# Patient Record
Sex: MALE | Race: WHITE | NOT HISPANIC OR LATINO | Employment: OTHER | URBAN - METROPOLITAN AREA
[De-identification: names, ages, dates, MRNs, and addresses within clinical notes are randomized per-mention and may not be internally consistent; named-entity substitution may affect disease eponyms.]

---

## 2017-01-25 ENCOUNTER — TELEPHONE (OUTPATIENT)
Dept: FAMILY MEDICINE | Facility: CLINIC | Age: 57
End: 2017-01-25

## 2017-01-25 RX ORDER — GABAPENTIN 600 MG/1
600 TABLET ORAL 2 TIMES DAILY
Qty: 90 TABLET | Refills: 2 | Status: SHIPPED | OUTPATIENT
Start: 2017-01-25 | End: 2017-04-06 | Stop reason: SDUPTHER

## 2017-01-25 NOTE — TELEPHONE ENCOUNTER
----- Message from Melany Moya sent at 1/25/2017 10:07 AM CST -----  Contact: self  Patient is calling for drug screening results.   Patient also needs a refill on Gabapentin.  Please call patient at [phone]. Thanks!     [facility] [address] [place] [address] AT [address] [address]  Phone: [phone] Fax: [phone]

## 2017-01-25 NOTE — TELEPHONE ENCOUNTER
Please review and advise. Pt's Gabapentin was done on 12/15/16 but not sent to pharmacy. Please refill. Will call pt to advise.

## 2017-03-13 ENCOUNTER — OFFICE VISIT (OUTPATIENT)
Dept: FAMILY MEDICINE | Facility: CLINIC | Age: 57
End: 2017-03-13
Payer: MEDICARE

## 2017-03-13 VITALS
DIASTOLIC BLOOD PRESSURE: 80 MMHG | TEMPERATURE: 99 F | RESPIRATION RATE: 16 BRPM | WEIGHT: 240.06 LBS | SYSTOLIC BLOOD PRESSURE: 136 MMHG | HEIGHT: 68 IN | HEART RATE: 76 BPM | BODY MASS INDEX: 36.38 KG/M2

## 2017-03-13 DIAGNOSIS — B18.1 CHRONIC VIRAL HEPATITIS B WITHOUT DELTA AGENT AND WITHOUT COMA: ICD-10-CM

## 2017-03-13 DIAGNOSIS — M51.37 DEGENERATION OF LUMBAR OR LUMBOSACRAL INTERVERTEBRAL DISC: ICD-10-CM

## 2017-03-13 DIAGNOSIS — M54.50 CHRONIC LEFT-SIDED LOW BACK PAIN WITHOUT SCIATICA: ICD-10-CM

## 2017-03-13 DIAGNOSIS — F41.9 ANXIETY: Primary | ICD-10-CM

## 2017-03-13 DIAGNOSIS — G89.29 CHRONIC LEFT-SIDED LOW BACK PAIN WITHOUT SCIATICA: ICD-10-CM

## 2017-03-13 PROCEDURE — 99214 OFFICE O/P EST MOD 30 MIN: CPT | Mod: S$GLB,,, | Performed by: FAMILY MEDICINE

## 2017-03-13 PROCEDURE — 1160F RVW MEDS BY RX/DR IN RCRD: CPT | Mod: S$GLB,,, | Performed by: FAMILY MEDICINE

## 2017-03-13 PROCEDURE — 3075F SYST BP GE 130 - 139MM HG: CPT | Mod: S$GLB,,, | Performed by: FAMILY MEDICINE

## 2017-03-13 PROCEDURE — 3079F DIAST BP 80-89 MM HG: CPT | Mod: S$GLB,,, | Performed by: FAMILY MEDICINE

## 2017-03-13 PROCEDURE — 99499 UNLISTED E&M SERVICE: CPT | Mod: S$GLB,,, | Performed by: FAMILY MEDICINE

## 2017-03-13 PROCEDURE — 99999 PR PBB SHADOW E&M-EST. PATIENT-LVL III: CPT | Mod: PBBFAC,,, | Performed by: FAMILY MEDICINE

## 2017-03-13 RX ORDER — ALPRAZOLAM 1 MG/1
1 TABLET ORAL 2 TIMES DAILY
Qty: 60 TABLET | Refills: 2 | Status: SHIPPED | OUTPATIENT
Start: 2017-03-13 | End: 2017-05-10 | Stop reason: SDUPTHER

## 2017-03-13 RX ORDER — ONDANSETRON 4 MG/1
4 TABLET, ORALLY DISINTEGRATING ORAL EVERY 12 HOURS PRN
Qty: 20 TABLET | Refills: 1 | Status: SHIPPED | OUTPATIENT
Start: 2017-03-13 | End: 2017-09-08 | Stop reason: SDUPTHER

## 2017-03-13 RX ORDER — ALPRAZOLAM 0.5 MG/1
0.5 TABLET ORAL
COMMUNITY
End: 2017-03-13 | Stop reason: SDUPTHER

## 2017-03-13 RX ORDER — ASPIRIN 81 MG/1
81 TABLET ORAL
COMMUNITY

## 2017-03-13 NOTE — PROGRESS NOTES
"Subjective:       Patient ID: Cholo Nogueira is a 56 y.o. male.    Chief Complaint: Back Pain and Chest Pain    HPI Comments: Follow-up chronic anxiety.  He takes Xanax twice a day.  Stable dose.  His most difficult problem in his low back pain with radiation down the left leg.  He is also experiencing burning numbness and weakness in his left leg.  He was recently changed from methadone and dialogue that over to Suboxone.  He does not feel that it helps his pain as well.  He is only been on it for one week.  He has looked into the possibility of a morphine pump.  He saw a spine surgeon a year ago who recommended a fusion.    Back Pain   Associated symptoms include chest pain, numbness and weakness. Pertinent negatives include no fever.   Chest Pain    Associated symptoms include back pain, numbness and weakness. Pertinent negatives include no fever.     Review of Systems   Constitutional: Negative for fever.   Cardiovascular: Positive for chest pain.   Musculoskeletal: Positive for back pain.   Neurological: Positive for weakness and numbness.       Objective:     Blood pressure 136/80, pulse 76, temperature 98.8 °F (37.1 °C), temperature source Oral, resp. rate 16, height 5' 8" (1.727 m), weight 108.9 kg (240 lb 1.3 oz).      Physical Exam   Constitutional:   He is obese and in no distress at rest.  It is very difficult for him to rise up out of a chair and he uses a walker.   Cardiovascular: Normal rate and regular rhythm.    Pulmonary/Chest: Effort normal and breath sounds normal.   Neurological:   Decreased sensation to light touch in the left leg.  The left leg appears to have some atrophy.   Psychiatric: He has a normal mood and affect.       Assessment:       1. Anxiety    2. Chronic left-sided low back pain without sciatica    3. Degeneration of lumbar or lumbosacral intervertebral disc    4. Chronic viral hepatitis B without delta agent and without coma        Plan:       Continue Xanax.  I advised him to " consider lumbar surgery since he has worsening of his left leg numbness and weakness.  We discussed smoking cessation.  He bought nicotine patches.

## 2017-03-13 NOTE — MR AVS SNAPSHOT
[facility]  [address]  [place] [address]  Phone: [phone]  Fax: [phone]                  Cholo Nogueira   3/13/2017 10:00 AM   Office Visit    Description:  Male : 1960   Provider:  Red Rdz MD   Department:  [facility]           Reason for Visit     Back Pain     Chest Pain           Diagnoses this Visit        Comments    Anxiety    -  Primary     Chronic left-sided low back pain without sciatica         Degeneration of lumbar or lumbosacral intervertebral disc         Chronic viral hepatitis B without delta agent and without coma                To Do List           Future Appointments        Provider Department Dept Phone    3/24/2017 8:00 AM LAB, [place] [facility] [phone]      Goals (5 Years of Data)     None       These Medications        Disp Refills Start End    alprazolam (XANAX) 1 MG tablet 60 tablet 2 3/13/2017     Take 1 tablet (1 mg total) by mouth 2 (two) times daily. as needed for anxiety. - Oral    Pharmacy: [facility] [address] [place] [address] Ph #: [phone]       ondansetron (ZOFRAN-ODT) 4 MG TbDL 20 tablet 1 3/13/2017     Take 1 tablet (4 mg total) by mouth every 12 (twelve) hours as needed. - Oral    Pharmacy: [facility] [place] [address] [facility] Ph #: [phone]         [facility]     [facility] -  Assistance  Registered nurses in the [facility] provide clinical advisement, health education, appointment booking, and other advisory services.  Call for this free service at [phone].             Medications           Message regarding Medications     Verify the changes and/or additions to your medication regime listed below are the same as discussed with your clinician today.  If any of these changes or additions are  "incorrect, please notify your healthcare provider.        CHANGE how you are taking these medications     Start Taking Instead of    ondansetron (ZOFRAN-ODT) 4 MG TbDL ondansetron (ZOFRAN-ODT) 4 MG TbDL    Dosage:  Take 1 tablet (4 mg total) by mouth every 12 (twelve) hours as needed. Dosage:  DISSOLVE 1 TABLET ON TONGUE EVERY 8 HOURS AS NEEDED FOR NAUSEA    Reason for Change:  Reorder       STOP taking these medications     UNABLE TO FIND medication name:Lido- oovz-wikr-xuaq Cream as needed for pain           Verify that the below list of medications is an accurate representation of the medications you are currently taking.  If none reported, the list may be blank. If incorrect, please contact your healthcare provider. Carry this list with you in case of emergency.           Current Medications     alprazolam (XANAX) 1 MG tablet Take 1 tablet (1 mg total) by mouth 2 (two) times daily. as needed for anxiety.    ascorbic acid (VITAMIN C) 500 MG tablet Take 1,000 mg by mouth once daily.     aspirin (ECOTRIN) 81 MG EC tablet Take 81 mg by mouth.    ASPIRIN/CAFFEINE (ANACIN ORAL) Take by mouth. Pt states he takes " a quarter of the pill as needed maybe three times weekly"    b complex vitamins capsule Take 1 capsule by mouth once daily.    buprenorphine-naloxone (SUBOXONE) 8-2 mg Film Take 2.5 Films per day    gabapentin (NEURONTIN) 600 MG tablet Take 1 tablet (600 mg total) by mouth 2 (two) times daily.    multivitamin (ONE DAILY MULTIVITAMIN) per tablet Take 1 tablet by mouth once daily.    ondansetron (ZOFRAN-ODT) 4 MG TbDL Take 1 tablet (4 mg total) by mouth every 12 (twelve) hours as needed.    HYDROmorphone (DILAUDID) 8 MG tablet Take 6 mg by mouth every 8 (eight) hours as needed.    methadone (DOLOPHINE) 10 MG tablet Take 10 mg by mouth 3 (three) times daily.            Clinical Reference Information           Your Vitals Were     BP Pulse Temp Resp Height Weight    136/80 (BP Location: Left arm, Patient " "Position: Sitting) 76 98.8 °F (37.1 °C) (Oral) 16 5' 8" (1.727 m) 108.9 kg (240 lb 1.3 oz)    BMI                36.5 kg/m2          Blood Pressure          Most Recent Value    BP  136/80      Allergies as of 3/13/2017     Ms Contin [Morphine]    Soma [Carisoprodol]    Tylenol [Acetaminophen]    Ultram [Tramadol]    Aspirin    Penicillins      Immunizations Administered on Date of Encounter - 3/13/2017     None      Orders Placed During Today's Visit     Future Labs/Procedures Expected by Expires    CBC Without Differential  3/13/2017 3/14/2018    Comprehensive metabolic panel  3/13/2017 3/14/2018      Smoking Cessation     If you would like to quit smoking:   You may be eligible for free services if you are a [place] resident and started smoking cigarettes before September 1, 1988.  Call the Smoking Cessation Trust (SCT) toll free at (323) 712-8971 or (978) 499-2864.   Call 5-361-QUIT-NOW if you do not meet the above criteria.            Language Assistance Services     ATTENTION: Language assistance services are available, free of charge. Please call [phone].      ATENCIÓN: Si habla español, tiene a headley disposición servicios gratuitos de asistencia lingüística. Llame al [phone].     CHÚ Ý: N?u b?n nói Ti?ng Vi?t, có các d?ch v? h? tr? ngôn ng? mi?n phí dành cho b?n. G?i s? [phone].         [facility] complies with applicable Federal civil rights laws and does not discriminate on the basis of race, color, national origin, age, disability, or sex.        "

## 2017-04-17 ENCOUNTER — TELEPHONE (OUTPATIENT)
Dept: FAMILY MEDICINE | Facility: CLINIC | Age: 57
End: 2017-04-17

## 2017-04-17 DIAGNOSIS — M51.36 DDD (DEGENERATIVE DISC DISEASE), LUMBAR: Primary | ICD-10-CM

## 2017-04-17 NOTE — TELEPHONE ENCOUNTER
Pt insurance company is requesting pt to see neurology for his pain management. Pt is requesting referral be faxed to phone # provided in previous message.  Please review pended referral and advise.

## 2017-04-17 NOTE — TELEPHONE ENCOUNTER
----- Message from Aristides Fitzpatrick sent at 4/13/2017  4:01 PM CDT -----  Contact: same  Patient called in and needs a referral for another pain management physician, which is now on the [facility].  Dr. Isaak Ackerman.    Fax number is [phone]  Phone number is [phone]    Patient call back number is [phone]

## 2017-05-08 ENCOUNTER — TELEPHONE (OUTPATIENT)
Dept: FAMILY MEDICINE | Facility: CLINIC | Age: 57
End: 2017-05-08

## 2017-05-08 NOTE — TELEPHONE ENCOUNTER
----- Message from Fariba Stewart sent at 5/8/2017 11:04 AM CDT -----  Contact: Cholo Garcia to see if a referral was sent to Dr. Isaak Ackerman at [facility]. Left a fax number to send it a couple of weeks ago. Please call [phone], Thanks!

## 2017-05-08 NOTE — TELEPHONE ENCOUNTER
----- Message from Melany Moya sent at 5/8/2017 11:31 AM CDT -----  Contact: self  Patient is returning call regarding referral.  Please call patient at [phone].  Thanks!

## 2017-05-08 NOTE — TELEPHONE ENCOUNTER
----- Message from Aristides Fitzpatrick sent at 5/8/2017 12:43 PM CDT -----  Contact: same  Patient called in and wanted to send over a corrected fax number for Dr. Isaak Ackerman.  Fax number is: [phone].  Patient stated that a recommendation for Dr. Ackerman to see patient was supposed to be faxed over from Dr. Rdz.    Patient call back number is [phone]

## 2017-05-09 NOTE — TELEPHONE ENCOUNTER
Rec'd call from [employer] and rec'd response in Epic:  Spoke to Carl DE LA ROSA at [facility] who stated Dr Isaak Ackerman III is not in network with the patient's [facility] Total Care Advantage plan. She also stated the patient does not have out of network benefits. The referral authorization is denied.    Spoke w/ pt and advised. He states he has an appointment w/ his [facility] rep tomorrow and will find out who he can see and what he/we need to do. He will call back once he has names of providers he can see and we will resubmit order.

## 2017-05-09 NOTE — TELEPHONE ENCOUNTER
Spoke w/ [employer]. She is awaiting a call back to determine if this provider will be covered. She will et us know once she has rec'd a response.

## 2017-05-09 NOTE — TELEPHONE ENCOUNTER
"Per response from tayler in [facility]:    Per [employer] web site, Dr Isaak Ackerman III is not in network with the patient's [employer] Total Care Advantage plan. Authorization requires medical review.      Spoke w/ pt. He states "they switched my [employer] to HMO, started on May 1" as this new plan helps with transportation. He states he spoke w/ Anai at [facility] and she is the person who found this provider for the pt. Her phone number is [phone]. He also states online, his plan doesn't show Dr Rdz as a provider, but found out that is incorrect. Advised pt that we would get with [employer] and see if they would contact Anai/Danae to get more info. Will call pt back once we have more info.   IM sent to [employer] in precert. Awaiting her call to give her the above info.    "

## 2017-05-10 RX ORDER — ALPRAZOLAM 1 MG/1
TABLET ORAL
Qty: 60 TABLET | Refills: 1 | Status: SHIPPED | OUTPATIENT
Start: 2017-05-10 | End: 2017-06-12 | Stop reason: SDUPTHER

## 2017-05-15 ENCOUNTER — TELEPHONE (OUTPATIENT)
Dept: FAMILY MEDICINE | Facility: CLINIC | Age: 57
End: 2017-05-15

## 2017-05-15 NOTE — TELEPHONE ENCOUNTER
----- Message from Elainaphoenix Rodriguez sent at 5/15/2017  2:51 PM CDT -----  Please call patient in reference to pain management.  Please call patient at [phone].

## 2017-05-15 NOTE — TELEPHONE ENCOUNTER
Spoke w/ pt. He states he spoke w/ Anai, his [employer] rep([phone]), on a 3 way call w/ [employer]. He found out that they had his information/ numbers wrong in their system. Pt confirmed corrected group # and ID #. He states that since the correction, they did confirm that Dr Ackerman is covered on pt's plan. Ms sent to [employer] in precert asking her to call us so we can relay this info to her and see if she can get it approved.

## 2017-05-18 NOTE — TELEPHONE ENCOUNTER
Spoke with pt, he states that his ins plan was changed as of 5/1 [facility] and that Dr. Ackerman is covered.  Per [employer] in pre-cert she was told by [facility] the pt still has [facility] total care advantage. Pt states that he has his card with him.  I instructed him that he needed to speak with the phone staff to update his insurance information and I relayed this information to [employer] who will wait for pt to update his info with [facility] and then re-run the pre-cert with the updated info.

## 2017-06-12 RX ORDER — ALPRAZOLAM 1 MG/1
1 TABLET ORAL 2 TIMES DAILY
Qty: 60 TABLET | Refills: 0 | Status: SHIPPED | OUTPATIENT
Start: 2017-06-12 | End: 2017-10-05 | Stop reason: SDUPTHER

## 2017-06-12 RX ORDER — GABAPENTIN 600 MG/1
600 TABLET ORAL 2 TIMES DAILY
Qty: 60 TABLET | Refills: 0 | Status: SHIPPED | OUTPATIENT
Start: 2017-06-12 | End: 2017-07-03 | Stop reason: SDUPTHER

## 2017-06-12 NOTE — TELEPHONE ENCOUNTER
----- Message from Elaina Jennifer sent at 6/12/2017  9:35 AM CDT -----  Patient states that he has an appointment scheduled for 6-21 but need refills on Rx Noroxin and Rx Alprazolam 1 mg.  Please send into [facility] pharmacy that is on file.  Any questions call [phone].

## 2017-06-15 ENCOUNTER — TELEPHONE (OUTPATIENT)
Dept: FAMILY MEDICINE | Facility: CLINIC | Age: 57
End: 2017-06-15

## 2017-06-15 NOTE — TELEPHONE ENCOUNTER
Sent message to wing in referral department and she states she will look into this and will let me know.

## 2017-06-15 NOTE — TELEPHONE ENCOUNTER
----- Message from Anh Lara sent at 6/15/2017 11:33 AM CDT -----  Contact: self  Had problems with insurance, so they need a new referral for Dr Ackerman, states you have the fax number for the doctor.  Please call back [phone] (home).

## 2017-06-22 ENCOUNTER — TELEPHONE (OUTPATIENT)
Dept: FAMILY MEDICINE | Facility: CLINIC | Age: 57
End: 2017-06-22

## 2017-06-22 NOTE — TELEPHONE ENCOUNTER
----- Message from Wilmer Lovelace sent at 6/22/2017 11:48 AM CDT -----  Contact: Pt   Pt is calling to get the name of the Pain Management provider that he is being referred to. Pt can be reached at [phone] (home)

## 2017-07-12 ENCOUNTER — TELEPHONE (OUTPATIENT)
Dept: FAMILY MEDICINE | Facility: CLINIC | Age: 57
End: 2017-07-12

## 2017-07-12 NOTE — TELEPHONE ENCOUNTER
----- Message from Pamela Spann sent at 7/12/2017 12:40 PM CDT -----  Contact: Patient   Patient states that he will be needing refills for the Gabapentin (NEURONTIN) 600 MG tablets, alprazolam (XANAX) 1 MG tablets and the ondansetron (ZOFRAN-ODT) 4 MG TbDL.  Can you please look in to this matter.  Any questions, please call [phone].  Thank you      [facility] [address] [place] [address] AT [address] [address]  Phone: [phone] Fax: [phone]

## 2017-07-28 ENCOUNTER — HOSPITAL ENCOUNTER (EMERGENCY)
Facility: HOSPITAL | Age: 57
Discharge: HOME | End: 2017-07-28
Attending: EMERGENCY MEDICINE
Payer: MEDICARE

## 2017-07-28 VITALS
TEMPERATURE: 99 F | DIASTOLIC BLOOD PRESSURE: 60 MMHG | BODY MASS INDEX: 33.43 KG/M2 | OXYGEN SATURATION: 98 % | RESPIRATION RATE: 20 BRPM | HEART RATE: 70 BPM | HEIGHT: 71 IN | SYSTOLIC BLOOD PRESSURE: 110 MMHG | WEIGHT: 238.81 LBS

## 2017-07-28 DIAGNOSIS — M54.50 ACUTE EXACERBATION OF CHRONIC LOW BACK PAIN: Primary | ICD-10-CM

## 2017-07-28 DIAGNOSIS — G89.29 ACUTE EXACERBATION OF CHRONIC LOW BACK PAIN: Primary | ICD-10-CM

## 2017-07-28 PROCEDURE — 99284 EMERGENCY DEPT VISIT MOD MDM: CPT

## 2017-07-28 PROCEDURE — 25000003 PHARM REV CODE 250: Performed by: PHYSICIAN ASSISTANT

## 2017-07-28 RX ORDER — LIDOCAINE 50 MG/G
1 PATCH TOPICAL
Status: DISCONTINUED | OUTPATIENT
Start: 2017-07-28 | End: 2017-07-29 | Stop reason: HOSPADM

## 2017-07-28 RX ADMIN — LIDOCAINE 1 PATCH: 50 PATCH CUTANEOUS at 07:07

## 2017-07-28 NOTE — ED TRIAGE NOTES
Pt c/o of back and leg pain, constant 10 out of 10 pain. States he has knot on right side of abdomen that is causing pain also. Reports his left is numb and giving out on him. Pain has been going on for years. Pain unrelieved with meds, getting progressively worse. Has appt with pain management on august 15th.

## 2017-07-29 NOTE — DISCHARGE INSTRUCTIONS
Continue utilizing medications for back pain.  Proceed to scheduled appointment with pain management.  Return to this ED if any problems occur.

## 2017-07-29 NOTE — ED PROVIDER NOTES
"Encounter Date: 7/28/2017       History     Chief Complaint   Patient presents with    Back Pain     LEFT sided lower back pain xcouple years. "its the worst today"     55yo m with pmh DDD, depression, GERD, hep B, chronic low back pain, opioid dependence, osteomyelitis s/p repair/removal of sternoclavicular joints, lumbar fx, nephrolithiasis, neuralgia, presents to ED with chief compliant worsening low back pain. He denies trauma or recent injury. Pt states he has experienced worsening radiculopathy down bilateral thighs. He admits to difficulty with ambulation. He denies GI/ issues. He denies bowel/bladder incontinence.     Pt states he was to have a pain pump placed, however an orthopedic physician was concerned about chance of infection, therefore pump has been put on hold. There are physician notes which state pt is to eventually have a lumbar fusion, however no date has been scheduled. He states he has not run out of his normal pain medicines, however states that he cannot secure an appt with his PCP or pain management physician until next month. PCP notes declare pt is to not have benzodiazepines.          Review of patient's allergies indicates:   Allergen Reactions    Ms contin [morphine] Nausea Only    Soma [carisoprodol] Nausea And Vomiting    Tylenol [acetaminophen]      Liver damage    Ultram [tramadol] Nausea Only    Aspirin Other (See Comments)     CAN take low dose. Gets stomach ulcers and indigestion    Penicillins Rash     Past Medical History:   Diagnosis Date    Anticoagulant long-term use     ASA daily, very low dose, 1/4 of 325mg    Anxiety     Arthritis     Bacteremia     with acute kidney failure    DDD (degenerative disc disease), lumbar     Depression     Son killed in [place]    GERD (gastroesophageal reflux disease)     associated with ASA use    Hepatitis B     History of liver failure     Low back pain 5/2/2012    Lumbar vertebral fracture 1985    Mobility " impaired     Back pain and chest weakness, using walker    Nephrolithiasis     Neuralgia     Osteomyelitis     Abscess of Bilatteral Steroclavicular joints    Peptic ulcer disease      Past Surgical History:   Procedure Laterality Date    Epidural Steroid injection      Pain management    I&D sternoclavicular joint abscesses      3/2012 -4/2012, 3 surgeries, wound vac    TONSILLECTOMY       Family History   Problem Relation Age of Onset    Diabetes Mother     Cholelithiasis Mother     Cancer Mother      Thyroid    Alcohol abuse Mother     Stroke Mother     Diabetes Maternal Grandfather     Alcohol abuse Father      Social History   Substance Use Topics    Smoking status: Current Every Day Smoker     Packs/day: 0.50     Years: 20.00     Types: Cigarettes     Start date: 7/28/1978    Smokeless tobacco: Never Used    Alcohol use No     Review of Systems   Constitutional: Negative for fever.   HENT: Negative for sore throat.    Respiratory: Negative for cough, chest tightness, shortness of breath and wheezing.    Cardiovascular: Negative for chest pain.   Gastrointestinal: Negative for abdominal pain, constipation, diarrhea, nausea and vomiting.   Genitourinary: Negative for dysuria.   Musculoskeletal: Positive for back pain and gait problem. Negative for myalgias, neck pain and neck stiffness.   Skin: Negative for rash.   Neurological: Negative for dizziness, syncope, weakness, light-headedness and headaches.   Hematological: Does not bruise/bleed easily.       Physical Exam     Initial Vitals [07/28/17 1802]   BP Pulse Resp Temp SpO2   110/60 70 20 98.7 °F (37.1 °C) 98 %      MAP       76.67         Physical Exam    Nursing note and vitals reviewed.  Constitutional: He appears well-developed and well-nourished. He is not diaphoretic. No distress.   HENT:   Head: Normocephalic and atraumatic.   Mouth/Throat: Oropharynx is clear and moist.   Eyes: Conjunctivae and EOM are normal. Pupils are equal,  round, and reactive to light.   Neck: Normal range of motion. Neck supple.   Pulmonary/Chest: Breath sounds normal. No respiratory distress. He has no wheezes.   Abdominal: Soft. Bowel sounds are normal. He exhibits no distension. There is no tenderness.   Musculoskeletal:        Thoracic back: He exhibits decreased range of motion, tenderness and bony tenderness. He exhibits no swelling and no deformity.        Lumbar back: He exhibits decreased range of motion, tenderness and bony tenderness. He exhibits no swelling and no deformity.   Neurological: He is alert and oriented to person, place, and time. He has normal strength.   Skin: Skin is warm and dry. Capillary refill takes less than 2 seconds. No rash and no abscess noted. No erythema.   Psychiatric: He has a normal mood and affect. His behavior is normal. Judgment and thought content normal.         ED Course   Procedures  Labs Reviewed - No data to display          Medical Decision Making:   Initial Assessment:   57yo m with chief complaint worsening low back pain  Differential Diagnosis:   Chronic low back pain, cauda equina, acute on chronic low back pain  ED Management:  Patient overall well-appearing, in no acute distress, afebrile, vitals within normal limits.    Pt denies any new trauma. He admits to worsening back pain. Pt recently seen at a neighboring hospital. The note reflects suspicion for drug-seeking behavior. Pt apparently eloped from hospital with IV in place. Bloodwork was performed at that time, which was WNL. I do not feel that pt has acute, emergent process at this time. I do not suspect cauda equina at this time. I do not feel pt should be given narcotic pain medicines at this time. Lidoderm patch applied without relief. Pt does not want lidoderm rx.     I will d/c and have encouraged pt to seek sooner appt with pain management or PCP. Return precautions given.   Other:   I have discussed this case with another health care provider.        <> Summary of the Discussion: I have discussed this case with .               Attending Attestation:     Physician Attestation Statement for NP/PA:   I discussed this assessment and plan of this patient with the NP/PA, but I did not personally examine the patient. The face to face encounter was performed by the NP/PA.    Other NP/PA Attestation Additions:      Medical Decision Making: Agree with assessment and plan.                 ED Course     Clinical Impression:   The encounter diagnosis was Acute exacerbation of chronic low back pain.    Disposition:   Disposition: Discharged  Condition: Stable  Pt was able to ambulate across ED prior to D/C. He did have trouble sitting/standing, however ambulation with support was successful. I do not suspect cauda equina.                         YUE PringleC  07/29/17 1223       Ky Beckham MD  07/30/17 1620

## 2017-08-10 ENCOUNTER — TELEPHONE (OUTPATIENT)
Dept: FAMILY MEDICINE | Facility: CLINIC | Age: 57
End: 2017-08-10

## 2017-08-10 DIAGNOSIS — Z12.11 COLON CANCER SCREENING: Primary | ICD-10-CM

## 2017-08-10 NOTE — TELEPHONE ENCOUNTER
----- Message from Kylie Hickman sent at 8/10/2017  9:36 AM CDT -----  Call pt at [phone]  Asking to schedule the colonoscopy for the [facility]

## 2017-08-14 NOTE — TELEPHONE ENCOUNTER
Spoke w/ pt. Informed pt about orders placed, and provided pt with number to call to schd. Pt verbalized understanding.

## 2017-08-14 NOTE — TELEPHONE ENCOUNTER
Tried to reach pt. No answer, left msg to call back. Attempted to contact about colonoscopy orders approved.

## 2017-08-30 ENCOUNTER — PATIENT OUTREACH (OUTPATIENT)
Dept: ADMINISTRATIVE | Facility: HOSPITAL | Age: 57
End: 2017-08-30

## 2017-08-30 NOTE — LETTER
August 30, 2017    Cholo Nogueira  [address] [place] [id number]             [facility]  [address]  [facility] [address]  Phone: [phone] Dear Mr. Nogueira:    [facility] is committed to your overall health.  To help you get the most out of each of your visits, we will review your information to make sure you are up to date on all of your recommended tests and/or procedures.      Dr. Krishna Wright has found that you may be due for a tetanus immunization.     Medicare does not cover all immunizations to be given in the clinic.  Check your benefits to ensure that you do not need to receive your immunizations at the pharmacy.    If you have had any of the above done at another facility, please bring the records or information with you so that your record at [facility] will be complete.  If you would like to schedule any of these, please contact me.    If you are currently taking medication, please bring it with you to your appointment for review.    If you have any questions or concerns, please don't hesitate to call.    Thank you for letting us care for you,  Fatou Bryant LPN Clinical Care Coordinator  [facility] [place] [facility] [place]  [phone]

## 2017-08-30 NOTE — LETTER
August 30, 2017    Cholo Nogueira  [address] [place] [address]             [facility]  [address]  [facility] [address]  Phone: [phone] Dear Mr. Nogueira:    [facility] is committed to your overall health.  To help you get the most out of each of your visits, we will review your information to make sure you are up to date on all of your recommended tests and/or procedures.      Dr. Red Rdz has found that you may be due for a tetanus immunization.     Medicare does not cover all immunizations to be given in the clinic.  Check your benefits to ensure that you do not need to receive your immunizations at the pharmacy.    If you have had any of the above done at another facility, please bring the records or information with you so that your record at [facility] will be complete.  If you would like to schedule any of these, please contact me.    If you are currently taking medication, please bring it with you to your appointment for review.    If you have any questions or concerns, please don't hesitate to call.    Thank you for letting us care for you,  Fatou Bryant LPN Clinical Care Coordinator  [facility] [place]  [phone]

## 2017-09-08 ENCOUNTER — TELEPHONE (OUTPATIENT)
Dept: FAMILY MEDICINE | Facility: CLINIC | Age: 57
End: 2017-09-08

## 2017-09-08 RX ORDER — ALPRAZOLAM 1 MG/1
TABLET ORAL
Qty: 60 TABLET | Refills: 0 | Status: SHIPPED | OUTPATIENT
Start: 2017-09-08 | End: 2017-09-29

## 2017-09-08 RX ORDER — ALPRAZOLAM 1 MG/1
TABLET ORAL
Qty: 60 TABLET | Refills: 0 | OUTPATIENT
Start: 2017-09-08

## 2017-09-08 RX ORDER — ONDANSETRON 4 MG/1
TABLET, ORALLY DISINTEGRATING ORAL
Qty: 20 TABLET | Refills: 0 | Status: SHIPPED | OUTPATIENT
Start: 2017-09-08 | End: 2018-05-09 | Stop reason: SDUPTHER

## 2017-09-08 NOTE — TELEPHONE ENCOUNTER
----- Message from Karie Dalal sent at 9/8/2017 12:17 PM CDT -----  Contact: Cholo  Patient is calling to state transportation bus did not show up to take to procedure for yesterday. States needs to have scheduled again. Please call [phone]. Thanks!

## 2017-09-11 RX ORDER — ALPRAZOLAM 1 MG/1
TABLET ORAL
Qty: 60 TABLET | Refills: 0 | OUTPATIENT
Start: 2017-09-11

## 2017-09-25 ENCOUNTER — PATIENT OUTREACH (OUTPATIENT)
Dept: ADMINISTRATIVE | Facility: HOSPITAL | Age: 57
End: 2017-09-25

## 2017-09-25 NOTE — LETTER
September 25, 2017    Cholo Nogueira  [address] [place] [address]             [facility]  [address]  [facility] [address]  Phone: [phone] Dear Mr. Nogueira:    [facility] is committed to your overall health.  To help you get the most out of each of your visits, we will review your information to make sure you are up to date on all of your recommended tests and/or procedures.      Dr. Red Rdz has found that you may be due for colon cancer screening and possibly tetanus and flu immunizations.     Medicare does not cover all immunizations to be given in the clinic.  Check your benefits to ensure that you do not need to receive your immunizations at the pharmacy.    If you have had any of the above done at another facility, please bring the records or information with you so that your record at [facility] will be complete.  If you would like to schedule any of these, please contact me.    If you are currently taking medication, please bring it with you to your appointment for review.    If you have any questions or concerns, please don't hesitate to call.    Thank you for letting us care for you,  Fatou Bryant LPN Clinical Care Coordinator  [facility] [place]  [phone]

## 2017-10-05 RX ORDER — ALPRAZOLAM 1 MG/1
TABLET ORAL
Qty: 60 TABLET | Refills: 0 | Status: SHIPPED | OUTPATIENT
Start: 2017-10-05 | End: 2017-11-09 | Stop reason: SDUPTHER

## 2017-10-30 ENCOUNTER — PATIENT OUTREACH (OUTPATIENT)
Dept: ADMINISTRATIVE | Facility: HOSPITAL | Age: 57
End: 2017-10-30

## 2017-10-30 NOTE — LETTER
October 30, 2017    Cholo Nogueira  [address] [address]             [facility]  [address]  [facility] [address]  Phone: [phone] Dear Mr. Nogueira:    [facility] committed to your overall health.  To help you get the most out of each of your visits, we will review your information to make sure you are up to date on all of your recommended tests and/or procedures.      Dr. Red Rdz has found that your chart shows you may be due for colon cancer screening and possibly tetanus and flu immunizations.     Medicare does not cover all immunizations to be given in the clinic.  Check your benefits to ensure that you do not need to receive your immunizations at the pharmacy.    If you have had any of the above done at another facility, please bring the records or information with you so that your record at [facility] will be complete.  If you would like to schedule any of these, please contact me.    If you are currently taking medication, please bring it with you to your appointment for review.    If you have any questions or concerns, please don't hesitate to call.    Thank you for letting us care for you,  Fatou Bryant LPN Clinical Care Coordinator  [facility] [place]  [phone]

## 2017-11-09 ENCOUNTER — OFFICE VISIT (OUTPATIENT)
Dept: FAMILY MEDICINE | Facility: CLINIC | Age: 57
End: 2017-11-09
Payer: MEDICARE

## 2017-11-09 VITALS
HEIGHT: 71 IN | DIASTOLIC BLOOD PRESSURE: 86 MMHG | BODY MASS INDEX: 33.97 KG/M2 | SYSTOLIC BLOOD PRESSURE: 130 MMHG | WEIGHT: 242.63 LBS | TEMPERATURE: 98 F

## 2017-11-09 DIAGNOSIS — E78.6 LOW HDL (UNDER 40): ICD-10-CM

## 2017-11-09 DIAGNOSIS — F41.9 ANXIETY: ICD-10-CM

## 2017-11-09 DIAGNOSIS — Z00.00 HEALTH MAINTENANCE EXAMINATION: ICD-10-CM

## 2017-11-09 DIAGNOSIS — M51.36 DDD (DEGENERATIVE DISC DISEASE), LUMBAR: ICD-10-CM

## 2017-11-09 DIAGNOSIS — M79.2 NEURALGIA OF CHEST: ICD-10-CM

## 2017-11-09 DIAGNOSIS — Z12.11 COLON CANCER SCREENING: Primary | ICD-10-CM

## 2017-11-09 PROCEDURE — 99396 PREV VISIT EST AGE 40-64: CPT | Mod: S$GLB,,, | Performed by: FAMILY MEDICINE

## 2017-11-09 PROCEDURE — 99999 PR PBB SHADOW E&M-EST. PATIENT-LVL III: CPT | Mod: PBBFAC,,, | Performed by: FAMILY MEDICINE

## 2017-11-09 RX ORDER — GABAPENTIN 600 MG/1
600 TABLET ORAL 3 TIMES DAILY
Qty: 90 TABLET | Refills: 5 | Status: SHIPPED | OUTPATIENT
Start: 2017-11-09 | End: 2018-05-03 | Stop reason: SDUPTHER

## 2017-11-09 RX ORDER — ALPRAZOLAM 1 MG/1
1 TABLET ORAL 2 TIMES DAILY
Qty: 60 TABLET | Refills: 2 | Status: SHIPPED | OUTPATIENT
Start: 2017-11-09 | End: 2018-01-02 | Stop reason: SDUPTHER

## 2017-11-09 NOTE — PROGRESS NOTES
"Subjective:       Patient ID: Cholo Nogueira is a 57 y.o. male.    Chief Complaint: Follow-up (med f/u)    He is here for follow-up.  He had been staying on the [place].  He had canceled quite a number of appointments.  I had refilled his Xanax.  He takes 1 mg twice a day.  He has been fairly reliable in his prescription refills.  He continues to see a pain management physician for Suboxone.  Gabapentin helps mostly with the neuralgia related to his previous chest surgery.  He would like to increase the dose for better control of the pain.      Review of Systems   Constitutional: Negative for fatigue, fever and unexpected weight change.   HENT: Negative.    Eyes: Negative for visual disturbance.   Respiratory: Negative for cough, shortness of breath and wheezing.    Cardiovascular: Positive for chest pain. Negative for palpitations and leg swelling.   Gastrointestinal: Negative for abdominal pain, blood in stool and diarrhea.   Genitourinary: Negative for difficulty urinating and hematuria.   Musculoskeletal: Positive for back pain.   Skin:        No neoplasms    Neurological: Negative for weakness and numbness.       Objective:     Blood pressure 130/86, temperature 98.3 °F (36.8 °C), temperature source Oral, height 5' 11" (1.803 m), weight 110.1 kg (242 lb 9.9 oz).      Physical Exam   Constitutional:   He is overweight and in mild distress.   Neck: No thyromegaly present.   Cardiovascular: Normal rate and regular rhythm.    Pulmonary/Chest: Effort normal and breath sounds normal.   He has a well-healed defect in his right upper chest above the sternum   Abdominal: He exhibits no distension. There is no tenderness.   Musculoskeletal: He exhibits no edema.   Lymphadenopathy:     He has no cervical adenopathy.   Neurological: He is alert.       Assessment:       1. Colon cancer screening    2. Health maintenance examination    3. Low HDL (under 40)    4. Anxiety    5. DDD (degenerative disc disease), lumbar    6. " Neuralgia of chest        Plan:       He is due for lab work.  I refilled Xanax for 3 months.  I increased his gabapentin to 600 mg 3 times a day.  Colonoscopy ordered.  He went through the prep several months ago but had to cancel because of transportation issues.

## 2018-01-03 RX ORDER — ALPRAZOLAM 1 MG/1
TABLET ORAL
Qty: 60 TABLET | Refills: 0 | Status: SHIPPED | OUTPATIENT
Start: 2018-01-03 | End: 2018-05-09 | Stop reason: SDUPTHER

## 2018-01-05 ENCOUNTER — TELEPHONE (OUTPATIENT)
Dept: FAMILY MEDICINE | Facility: CLINIC | Age: 58
End: 2018-01-05

## 2018-01-05 NOTE — TELEPHONE ENCOUNTER
----- Message from Fariba Stewart sent at 1/5/2018  3:30 PM CST -----  Contact: Cholo  Calling to get a new referral for his new pain management doctor. His appointment is on 01/08. Please fax Dr. Og [phone]. Also he wants his colonoscopy to be in [facility]. Please call [phone] (home)   thanks

## 2018-01-06 NOTE — TELEPHONE ENCOUNTER
Please draw up the referral for the pain doctor.  Also find out how to arrange colonoscopy in [place]. Does he have a doctor in mind that is in network.

## 2018-01-08 NOTE — TELEPHONE ENCOUNTER
Tried to reach pt. No answer, left msg to call back.    Contacting to see why pt needs a referral for pain management and who he would like to see. pcp would like to use provider in network for colonoscopy.

## 2018-01-12 NOTE — TELEPHONE ENCOUNTER
Tried to reach pt. No answer, left msg to call back.    [facility] and see who pt would like to see for pain management if pt has a preference.

## 2018-01-12 NOTE — TELEPHONE ENCOUNTER
----- Message from Shelby Marcelo sent at 1/11/2018  4:56 PM CST -----  Contact: Self  Patient is returning your call, he wants his colonoscopy scheduled in [place] and he also is looking for his referral for pain management.  Please call him at [phone] (home).  His phone just got out of the shop.  Thank you!

## 2018-02-28 ENCOUNTER — TELEPHONE (OUTPATIENT)
Dept: FAMILY MEDICINE | Facility: CLINIC | Age: 58
End: 2018-02-28

## 2018-02-28 NOTE — TELEPHONE ENCOUNTER
Spoke w/ pt. He states that [facility] told him that if he is moving back to the [facility] his pain mgmt MD on [facility] will no longer be covered on his plan. Advised pt to contact his benefits dept number on his insurance card to confirm whether or not he is going to have to change Pain Mgmt provider. IM sent to our [place] dept, will find out if they are familiar with what pt was told.

## 2018-02-28 NOTE — TELEPHONE ENCOUNTER
----- Message from Ysabel Sainz sent at 2/28/2018 11:07 AM CST -----  Contact: Self  Patient needs to speak to the nurse  needs to ask some questions about care     Please call back [phone] (home)

## 2018-02-28 NOTE — TELEPHONE ENCOUNTER
Spoke w/ [place] in [place]. She is not sure about the [employer] plan and this kind of coverage questions as she has not encountered this before. She states the only way to know would be to submit a new referral request and they would go through the protocol process and let us know. Will call pt to advise and make sure we have the correct insurance info that he is going to continue with before we submit the new referral.

## 2018-04-04 RX ORDER — ALPRAZOLAM 1 MG/1
TABLET ORAL
Qty: 60 TABLET | Refills: 0 | Status: SHIPPED | OUTPATIENT
Start: 2018-04-04 | End: 2018-05-09 | Stop reason: SDUPTHER

## 2018-04-27 NOTE — TELEPHONE ENCOUNTER
----- Message from Thuy Skaggs sent at 4/27/2018  8:51 AM CDT -----  Contact: pt  Pt calling states that he needs his ALPRAZolam (XANAX) 1 MG tablet filled today cause the pharmacy only delivers once a month to [place],and pt do not drive or have transportation.He is not trying to get early this is the only time and the pharmacy needs to get the Rx this morning before they leave out,please.Any questions can call him at ..[phone] (home)         [facility] [place] [facility] [address] [address]  [address] [address]  [facility] [address]  Phone: [phone] Fax: [phone]

## 2018-04-28 RX ORDER — ALPRAZOLAM 1 MG/1
TABLET ORAL
Qty: 60 TABLET | OUTPATIENT
Start: 2018-04-28

## 2018-05-05 RX ORDER — GABAPENTIN 600 MG/1
TABLET ORAL
Qty: 90 TABLET | Refills: 0 | Status: SHIPPED | OUTPATIENT
Start: 2018-05-05 | End: 2018-05-09 | Stop reason: SDUPTHER

## 2018-05-09 ENCOUNTER — OFFICE VISIT (OUTPATIENT)
Dept: FAMILY MEDICINE | Facility: CLINIC | Age: 58
End: 2018-05-09
Payer: MEDICARE

## 2018-05-09 VITALS
SYSTOLIC BLOOD PRESSURE: 120 MMHG | DIASTOLIC BLOOD PRESSURE: 80 MMHG | HEIGHT: 71 IN | TEMPERATURE: 99 F | BODY MASS INDEX: 33.92 KG/M2 | WEIGHT: 242.31 LBS

## 2018-05-09 DIAGNOSIS — G89.29 CHRONIC LEFT-SIDED LOW BACK PAIN WITHOUT SCIATICA: Primary | ICD-10-CM

## 2018-05-09 DIAGNOSIS — M54.50 CHRONIC LEFT-SIDED LOW BACK PAIN WITHOUT SCIATICA: Primary | ICD-10-CM

## 2018-05-09 DIAGNOSIS — Z72.0 TOBACCO ABUSE: ICD-10-CM

## 2018-05-09 DIAGNOSIS — M47.816 LUMBAR SPONDYLOSIS: ICD-10-CM

## 2018-05-09 DIAGNOSIS — F41.9 ANXIETY: ICD-10-CM

## 2018-05-09 PROCEDURE — 3079F DIAST BP 80-89 MM HG: CPT | Mod: CPTII,S$GLB,, | Performed by: FAMILY MEDICINE

## 2018-05-09 PROCEDURE — 3074F SYST BP LT 130 MM HG: CPT | Mod: CPTII,S$GLB,, | Performed by: FAMILY MEDICINE

## 2018-05-09 PROCEDURE — 99999 PR PBB SHADOW E&M-EST. PATIENT-LVL III: CPT | Mod: PBBFAC,,, | Performed by: FAMILY MEDICINE

## 2018-05-09 PROCEDURE — 3008F BODY MASS INDEX DOCD: CPT | Mod: CPTII,S$GLB,, | Performed by: FAMILY MEDICINE

## 2018-05-09 PROCEDURE — 99214 OFFICE O/P EST MOD 30 MIN: CPT | Mod: S$GLB,,, | Performed by: FAMILY MEDICINE

## 2018-05-09 RX ORDER — ALPRAZOLAM 1 MG/1
1 TABLET ORAL 2 TIMES DAILY
Qty: 60 TABLET | Refills: 2 | Status: SHIPPED | OUTPATIENT
Start: 2018-05-09 | End: 2018-08-07 | Stop reason: SDUPTHER

## 2018-05-09 RX ORDER — GABAPENTIN 600 MG/1
600 TABLET ORAL 3 TIMES DAILY
Qty: 90 TABLET | Refills: 2 | Status: SHIPPED | OUTPATIENT
Start: 2018-05-09 | End: 2018-11-08

## 2018-05-09 RX ORDER — ONDANSETRON 4 MG/1
TABLET, ORALLY DISINTEGRATING ORAL
Qty: 20 TABLET | Refills: 0 | Status: SHIPPED | OUTPATIENT
Start: 2018-05-09 | End: 2018-12-06 | Stop reason: SDUPTHER

## 2018-05-09 NOTE — PROGRESS NOTES
"Subjective:       Patient ID: Cholo Nogueira is a 57 y.o. male.    Chief Complaint: Follow-up (med f/u)    Fu chronic anxiety. He takes xanax bid. No side effects. He is now on suboxone for narcotic dependent pain. LBP goes down left leg and he has numbness and intermittent weakness when walking. Uses a walker. I reviewed his .  He has had some epidural injection with some benefit No recent MRI.      Review of Systems   Constitutional: Negative for fever and unexpected weight change.   Respiratory: Negative for cough and shortness of breath.    Cardiovascular: Negative for chest pain and palpitations.   Musculoskeletal: Positive for arthralgias and back pain.   Neurological: Positive for weakness and numbness.       Objective:     Blood pressure 120/80, temperature 99 °F (37.2 °C), temperature source Oral, height 5' 11" (1.803 m), weight 109.9 kg (242 lb 4.6 oz).      Physical Exam   Constitutional:   overweight and in moderate distress.   Cardiovascular: Normal rate, regular rhythm and normal heart sounds.    Pulmonary/Chest: Effort normal and breath sounds normal.   He has an upper chest scar.   Musculoskeletal:   Tender lumbosacral and lower lumbar spinous process area   Neurological:   Plus minus patellar reflexes and absent achilles reflexes   Positive straight leg raise on left.  Decreased sensation on the lateral aspect of left leg I think his ankle dorsiflexion strength is a little weaker.       Assessment:       1. Chronic left-sided low back pain without sciatica    2. Lumbar spondylosis    3. Tobacco abuse    4. Anxiety        Plan:       Lumbar spine MRI.  Pain consult in [place].  I refilled Xanax.   lab work ordered  "

## 2018-07-02 RX ORDER — GABAPENTIN 600 MG/1
TABLET ORAL
Qty: 90 TABLET | Refills: 0 | Status: SHIPPED | OUTPATIENT
Start: 2018-07-02 | End: 2018-08-02 | Stop reason: SDUPTHER

## 2018-08-03 RX ORDER — GABAPENTIN 600 MG/1
TABLET ORAL
Qty: 90 TABLET | Refills: 0 | Status: SHIPPED | OUTPATIENT
Start: 2018-08-03 | End: 2018-10-05 | Stop reason: SDUPTHER

## 2018-08-07 RX ORDER — ALPRAZOLAM 1 MG/1
TABLET ORAL
Qty: 60 TABLET | Refills: 0 | Status: SHIPPED | OUTPATIENT
Start: 2018-08-07 | End: 2018-09-06 | Stop reason: SDUPTHER

## 2018-08-07 RX ORDER — ALPRAZOLAM 1 MG/1
TABLET ORAL
Qty: 60 TABLET | Refills: 0 | OUTPATIENT
Start: 2018-08-07

## 2018-08-14 NOTE — PROGRESS NOTES
Called twice around 5pm but patient's mother could not be reached and her voicemail is full and I was unable to leave a message. Pre-visit Health Maintenance Review

## 2018-09-06 RX ORDER — ALPRAZOLAM 1 MG/1
1 TABLET ORAL 2 TIMES DAILY
Qty: 60 TABLET | Refills: 0 | Status: SHIPPED | OUTPATIENT
Start: 2018-09-06 | End: 2018-10-05 | Stop reason: SDUPTHER

## 2018-09-06 NOTE — TELEPHONE ENCOUNTER
----- Message from Katie Ta sent at 9/6/2018  9:28 AM CDT -----  Type:  RX Refill Request    Who Called:  Patient  Refill or New Rx:  refill  RX Name and Strength:  ALPRAZolam (XANAX) 1 MG tablet   How is the patient currently taking it? (ex. 1XDay):  2xday  Is this a 30 day or 90 day RX:  60  Preferred Pharmacy with phone number:    [facility] [address] [place] [address] & [address] [address]  Phone: [phone] Fax: [phone]  Local or Mail Order:  Local  Ordering Provider:  same  Best Call Back Number:  [phone]  Additional Information:  Call when completed.

## 2018-10-05 RX ORDER — GABAPENTIN 600 MG/1
TABLET ORAL
Qty: 90 TABLET | Refills: 0 | Status: SHIPPED | OUTPATIENT
Start: 2018-10-05 | End: 2018-11-08 | Stop reason: SDUPTHER

## 2018-10-05 RX ORDER — ALPRAZOLAM 1 MG/1
1 TABLET ORAL 2 TIMES DAILY
Qty: 60 TABLET | Refills: 0 | Status: SHIPPED | OUTPATIENT
Start: 2018-10-05 | End: 2018-11-08 | Stop reason: SDUPTHER

## 2018-10-05 NOTE — TELEPHONE ENCOUNTER
----- Message from Nunu Perez sent at 10/5/2018  9:22 AM CDT -----  Type:  RX Refill Request    Who Called:  Patient  RX Name and Strength: gabapentin (NEURONTIN) 600 MG tablet and ALPRAZolam (XANAX) 1 MG tablet  Preferred Pharmacy with phone number:  [facility] at [phone] (Phone)  Local or Mail Order:  local  Ordering Provider:  Dr. eKndell Zavala Call Back Number:  [phone]  Additional Information:

## 2018-11-08 ENCOUNTER — LAB VISIT (OUTPATIENT)
Dept: LAB | Facility: HOSPITAL | Age: 58
End: 2018-11-08
Attending: FAMILY MEDICINE
Payer: MEDICARE

## 2018-11-08 ENCOUNTER — OFFICE VISIT (OUTPATIENT)
Dept: FAMILY MEDICINE | Facility: CLINIC | Age: 58
End: 2018-11-08
Payer: MEDICARE

## 2018-11-08 VITALS
WEIGHT: 246.56 LBS | RESPIRATION RATE: 18 BRPM | HEART RATE: 67 BPM | BODY MASS INDEX: 34.52 KG/M2 | HEIGHT: 71 IN | OXYGEN SATURATION: 97 % | SYSTOLIC BLOOD PRESSURE: 142 MMHG | DIASTOLIC BLOOD PRESSURE: 82 MMHG | TEMPERATURE: 99 F

## 2018-11-08 DIAGNOSIS — F41.9 ANXIETY: ICD-10-CM

## 2018-11-08 DIAGNOSIS — B18.1 CHRONIC VIRAL HEPATITIS B WITHOUT DELTA AGENT AND WITHOUT COMA: ICD-10-CM

## 2018-11-08 DIAGNOSIS — G89.29 CHRONIC LEFT-SIDED LOW BACK PAIN WITH LEFT-SIDED SCIATICA: ICD-10-CM

## 2018-11-08 DIAGNOSIS — Z72.0 TOBACCO ABUSE: ICD-10-CM

## 2018-11-08 DIAGNOSIS — M54.42 CHRONIC LEFT-SIDED LOW BACK PAIN WITH LEFT-SIDED SCIATICA: ICD-10-CM

## 2018-11-08 DIAGNOSIS — B18.1 CHRONIC VIRAL HEPATITIS B WITHOUT DELTA AGENT AND WITHOUT COMA: Primary | ICD-10-CM

## 2018-11-08 LAB
ALBUMIN SERPL BCP-MCNC: 3.7 G/DL
ALP SERPL-CCNC: 89 U/L
ALT SERPL W/O P-5'-P-CCNC: 45 U/L
ANION GAP SERPL CALC-SCNC: 9 MMOL/L
AST SERPL-CCNC: 29 U/L
BILIRUB SERPL-MCNC: 0.7 MG/DL
BUN SERPL-MCNC: 10 MG/DL
CALCIUM SERPL-MCNC: 8.9 MG/DL
CHLORIDE SERPL-SCNC: 109 MMOL/L
CO2 SERPL-SCNC: 21 MMOL/L
CREAT SERPL-MCNC: 0.7 MG/DL
ERYTHROCYTE [DISTWIDTH] IN BLOOD BY AUTOMATED COUNT: 13.5 %
EST. GFR  (AFRICAN AMERICAN): >60 ML/MIN/1.73 M^2
EST. GFR  (NON AFRICAN AMERICAN): >60 ML/MIN/1.73 M^2
GLUCOSE SERPL-MCNC: 114 MG/DL
HCT VFR BLD AUTO: 46.1 %
HGB BLD-MCNC: 15.8 G/DL
MCH RBC QN AUTO: 30.4 PG
MCHC RBC AUTO-ENTMCNC: 34.3 G/DL
MCV RBC AUTO: 89 FL
PLATELET # BLD AUTO: 162 K/UL
PMV BLD AUTO: 13 FL
POTASSIUM SERPL-SCNC: 4.3 MMOL/L
PROT SERPL-MCNC: 7.6 G/DL
RBC # BLD AUTO: 5.19 M/UL
SODIUM SERPL-SCNC: 139 MMOL/L
WBC # BLD AUTO: 10.43 K/UL

## 2018-11-08 PROCEDURE — 85027 COMPLETE CBC AUTOMATED: CPT

## 2018-11-08 PROCEDURE — 80053 COMPREHEN METABOLIC PANEL: CPT

## 2018-11-08 PROCEDURE — G0008 ADMIN INFLUENZA VIRUS VAC: HCPCS | Mod: S$GLB,,, | Performed by: FAMILY MEDICINE

## 2018-11-08 PROCEDURE — 99214 OFFICE O/P EST MOD 30 MIN: CPT | Mod: 25,S$GLB,, | Performed by: FAMILY MEDICINE

## 2018-11-08 PROCEDURE — 90686 IIV4 VACC NO PRSV 0.5 ML IM: CPT | Mod: S$GLB,,, | Performed by: FAMILY MEDICINE

## 2018-11-08 PROCEDURE — 87517 HEPATITIS B DNA QUANT: CPT

## 2018-11-08 PROCEDURE — 99999 PR PBB SHADOW E&M-EST. PATIENT-LVL IV: CPT | Mod: PBBFAC,,, | Performed by: FAMILY MEDICINE

## 2018-11-08 PROCEDURE — 3077F SYST BP >= 140 MM HG: CPT | Mod: CPTII,S$GLB,, | Performed by: FAMILY MEDICINE

## 2018-11-08 PROCEDURE — 86803 HEPATITIS C AB TEST: CPT

## 2018-11-08 PROCEDURE — 3079F DIAST BP 80-89 MM HG: CPT | Mod: CPTII,S$GLB,, | Performed by: FAMILY MEDICINE

## 2018-11-08 PROCEDURE — 3008F BODY MASS INDEX DOCD: CPT | Mod: CPTII,S$GLB,, | Performed by: FAMILY MEDICINE

## 2018-11-08 PROCEDURE — 36415 COLL VENOUS BLD VENIPUNCTURE: CPT | Mod: PN

## 2018-11-08 RX ORDER — ALPRAZOLAM 1 MG/1
1 TABLET ORAL 2 TIMES DAILY
Qty: 180 TABLET | Refills: 1 | Status: SHIPPED | OUTPATIENT
Start: 2018-11-08 | End: 2019-05-02 | Stop reason: SDUPTHER

## 2018-11-08 RX ORDER — GABAPENTIN 600 MG/1
600 TABLET ORAL 3 TIMES DAILY
Qty: 270 TABLET | Refills: 1 | Status: SHIPPED | OUTPATIENT
Start: 2018-11-08 | End: 2019-09-04 | Stop reason: SDUPTHER

## 2018-11-08 RX ORDER — BUPROPION HYDROCHLORIDE 150 MG/1
150 TABLET ORAL DAILY
Refills: 1 | COMMUNITY
Start: 2018-11-02 | End: 2019-04-05

## 2018-11-08 NOTE — PROGRESS NOTES
"Subjective:       Patient ID: Cholo Nogueira is a 58 y.o. male.    Chief Complaint: Follow-up (6 months/f/u meds)    Follow-up anxiety.  He also has significant lumbar disc problems with left sciatica and worsening left leg weakness.  He is seeing Dr. Sena next week.  He takes Xanax 1 mg twice a day to help control his anxiety.  He has been on a stable dose with no problems. No recent falls.  He has a past history of chronic hepatitis-B.  His last blood work showed an improvement in his transaminase level and very low levels of viral DNA.  He has nearly stop smoking.  He is taking Wellbutrin and has cut back to 2 cigarettes daily.      Review of Systems   Constitutional: Negative for fever and unexpected weight change.   Respiratory: Negative for cough and shortness of breath.    Cardiovascular: Negative for chest pain, palpitations and leg swelling.   Musculoskeletal: Positive for back pain.   Neurological: Positive for weakness and numbness.   Psychiatric/Behavioral: The patient is nervous/anxious.        Objective:     Blood pressure (!) 142/82, pulse 67, temperature 98.6 °F (37 °C), temperature source Oral, resp. rate 18, height 5' 11" (1.803 m), weight 111.8 kg (246 lb 9.4 oz), SpO2 97 %.      Physical Exam   Constitutional:   He is overweight and in mild to moderate distress at times   Cardiovascular: Normal rate and regular rhythm.   Pulmonary/Chest: Effort normal and breath sounds normal. No respiratory distress.   He has an upper sternal defect.   Musculoskeletal:   Left calf is 16 in and right calf 17 in in circumference.  The left thigh seems to have less muscle tone.  He has decreased hip flexion strength and decreased knee extension strength.   Neurological: He is alert.       Assessment:       1. Chronic viral hepatitis B without delta agent and without coma    2. Chronic left-sided low back pain with left-sided sciatica    3. Tobacco abuse    4. Anxiety        Plan:       I refilled Xanax and gabapentin.  " Lab work ordered.

## 2018-11-09 LAB — HCV AB SERPL QL IA: POSITIVE

## 2018-11-15 ENCOUNTER — TELEPHONE (OUTPATIENT)
Dept: FAMILY MEDICINE | Facility: CLINIC | Age: 58
End: 2018-11-15

## 2018-11-15 NOTE — TELEPHONE ENCOUNTER
----- Message from Bonifacio Goodman sent at 11/15/2018 12:20 PM CST -----  Contact: pt  Pt is calling to see if he can get a referral sent over to Dr Gupta (Pain Doctor)  Call Back#[phone]  Thanks

## 2018-11-16 NOTE — TELEPHONE ENCOUNTER
----- Message from Nunu Perez sent at 11/16/2018  1:54 PM CST -----  Type:  Patient Returning Call    Who Called:  Patient  Who Left Message for Patient:  Simone Pryor  Does the patient know what this is regarding?:  NA  Best Call Back Number:  [phone]  Additional Information:

## 2018-11-27 ENCOUNTER — TELEPHONE (OUTPATIENT)
Dept: FAMILY MEDICINE | Facility: CLINIC | Age: 58
End: 2018-11-27

## 2018-11-27 DIAGNOSIS — M54.42 CHRONIC LEFT-SIDED LOW BACK PAIN WITH LEFT-SIDED SCIATICA: Primary | ICD-10-CM

## 2018-11-27 DIAGNOSIS — G89.29 CHRONIC LEFT-SIDED LOW BACK PAIN WITH LEFT-SIDED SCIATICA: Primary | ICD-10-CM

## 2018-11-27 NOTE — TELEPHONE ENCOUNTER
----- Message from Chris Guajardo sent at 11/27/2018 10:36 AM CST -----  Type: Needs Medical Advice    Who Called:  patient  Best Call Back Number: [phone]  Additional Information:patient is giving the office information about pain management dr grzegorz monroe [address] [address].fax number [phone] office number [phone]. Please call patient to advise.

## 2018-11-27 NOTE — TELEPHONE ENCOUNTER
----- Message from Nunu Perez sent at 11/27/2018 10:28 AM CST -----  Type: Calling back with information    Who Called:  Patient  Best Call Back Number: [phone]  Additional Information: Patient calling back with information for pain management referral/[facility] [address] [facility] [place] [address] and fax number [phone] or can call [phone].

## 2018-11-29 LAB
HBV DNA SERPL NAA+PROBE-ACNC: NORMAL LOGIU/ML
HBV DNA SERPL NAA+PROBE-LOG IU: NORMAL IU/ML

## 2018-11-29 RX ORDER — GABAPENTIN 600 MG/1
TABLET ORAL
Qty: 90 TABLET | Refills: 3 | Status: SHIPPED | OUTPATIENT
Start: 2018-11-29 | End: 2019-08-01 | Stop reason: SDUPTHER

## 2018-12-06 RX ORDER — ONDANSETRON 4 MG/1
TABLET, ORALLY DISINTEGRATING ORAL
Qty: 20 TABLET | Refills: 0 | Status: SHIPPED | OUTPATIENT
Start: 2018-12-06 | End: 2020-05-15 | Stop reason: SDUPTHER

## 2018-12-06 NOTE — TELEPHONE ENCOUNTER
----- Message from Tracey Shelton sent at 12/6/2018 10:30 AM CST -----  Contact: Patient  Type:  RX Refill Request    Who Called:  patient  Refill or New Rx:  refill  RX Name and Strength:  ondansetron (ZOFRAN-ODT) 4 MG TbDL  How is the patient currently taking it? (ex. 1XDay):  As needed  Is this a 30 day or 90 day RX:  na  Preferred Pharmacy with phone number:    [facility] [address] [place] [address] & [address] [address]  Phone: [phone] Fax: [phone]   Local or Mail Order:  local  Ordering Provider:  Dr. Kendell Zavala Call Back Number:  [phone] (home)    Additional Information:  gutierrez

## 2019-03-04 ENCOUNTER — HOSPITAL ENCOUNTER (OUTPATIENT)
Dept: RADIOLOGY | Facility: HOSPITAL | Age: 59
Discharge: HOME | End: 2019-03-04
Attending: FAMILY MEDICINE
Payer: MEDICARE

## 2019-03-04 DIAGNOSIS — M54.50 CHRONIC LEFT-SIDED LOW BACK PAIN WITHOUT SCIATICA: ICD-10-CM

## 2019-03-04 DIAGNOSIS — M47.816 LUMBAR SPONDYLOSIS: ICD-10-CM

## 2019-03-04 DIAGNOSIS — G89.29 CHRONIC LEFT-SIDED LOW BACK PAIN WITHOUT SCIATICA: ICD-10-CM

## 2019-03-04 PROCEDURE — 72148 MRI LUMBAR SPINE W/O DYE: CPT | Mod: TC

## 2019-03-04 PROCEDURE — 72148 MRI LUMBAR SPINE WITHOUT CONTRAST: ICD-10-PCS | Mod: 26,,, | Performed by: RADIOLOGY

## 2019-03-04 PROCEDURE — 72148 MRI LUMBAR SPINE W/O DYE: CPT | Mod: 26,,, | Performed by: RADIOLOGY

## 2019-03-08 ENCOUNTER — NURSE TRIAGE (OUTPATIENT)
Dept: ADMINISTRATIVE | Facility: CLINIC | Age: 59
End: 2019-03-08

## 2019-03-08 NOTE — TELEPHONE ENCOUNTER
Reason for Disposition   Message left on unidentified voice mail. Phone number verified.    Protocols used: NO CONTACT OR DUPLICATE CONTACT CALL-A-OH    Attempted to contact caller x 3. Phone number verified. No answer. Left message on unidentified voicemail.

## 2019-03-08 NOTE — TELEPHONE ENCOUNTER
Reason for Disposition   Difficulty breathing (per caller) not relieved by nasal washes    Protocols used: SINUS PAIN OR CONGESTION-P-OH    Current temp 101 oral. Appointment scheduled for today. Please contact caller directly to discuss any further care advice.

## 2019-03-11 ENCOUNTER — TELEPHONE (OUTPATIENT)
Dept: FAMILY MEDICINE | Facility: CLINIC | Age: 59
End: 2019-03-11

## 2019-03-11 NOTE — TELEPHONE ENCOUNTER
----- Message from Arlene Gomez sent at 3/11/2019  2:48 PM CDT -----  Type: Needs Medical Advice    [facility]    Best Call Back Number: [phone]  Additional Information: Patient is requesting prescription for nebulizer to be sent to [facility], recently discharged from hospital and was never ordered, fax # [phone]

## 2019-03-11 NOTE — TELEPHONE ENCOUNTER
[facility] called in stating that patient was in hospital for COPD exacerbation and flu. States that he was suppose to have a nebulizer at discharge and when he called back they stated that if he feels like he needs the machine he will need to contact PCP. Advised that Dr Rdz is out of clinic until Thursday and patient was last seen in Nov. Tracey with [facility] states that she will let patient know that Kendell is out and will address when he returns.

## 2019-03-13 RX ORDER — IPRATROPIUM BROMIDE AND ALBUTEROL SULFATE 2.5; .5 MG/3ML; MG/3ML
3 SOLUTION RESPIRATORY (INHALATION) EVERY 6 HOURS PRN
Qty: 1 BOX | Refills: 0 | Status: SHIPPED | OUTPATIENT
Start: 2019-03-13 | End: 2019-04-01 | Stop reason: SDUPTHER

## 2019-03-14 NOTE — TELEPHONE ENCOUNTER
----- Message from Abdirashid Rivera sent at 3/14/2019 10:13 AM CDT -----  Contact: Kylie gabe [facility] [phone]  Advised ptnt needs a script for the nebulizer machine for his treatment.  Please call [phone]      [facility] [address] [place] [address] & [address] [address]  Phone: [phone] Fax: [phone]

## 2019-03-20 ENCOUNTER — OFFICE VISIT (OUTPATIENT)
Dept: PAIN MEDICINE | Facility: CLINIC | Age: 59
End: 2019-03-20
Payer: MEDICARE

## 2019-03-20 VITALS
HEART RATE: 84 BPM | SYSTOLIC BLOOD PRESSURE: 112 MMHG | HEIGHT: 71 IN | BODY MASS INDEX: 36.26 KG/M2 | DIASTOLIC BLOOD PRESSURE: 74 MMHG | WEIGHT: 259 LBS

## 2019-03-20 DIAGNOSIS — M54.16 LUMBAR RADICULITIS: ICD-10-CM

## 2019-03-20 DIAGNOSIS — F11.20 UNCOMPLICATED OPIOID DEPENDENCE: ICD-10-CM

## 2019-03-20 DIAGNOSIS — M51.36 DDD (DEGENERATIVE DISC DISEASE), LUMBAR: Primary | ICD-10-CM

## 2019-03-20 PROCEDURE — 99499 RISK ADDL DX/OHS AUDIT: ICD-10-PCS | Mod: S$GLB,,, | Performed by: ANESTHESIOLOGY

## 2019-03-20 PROCEDURE — 99499 UNLISTED E&M SERVICE: CPT | Mod: S$GLB,,, | Performed by: ANESTHESIOLOGY

## 2019-03-20 PROCEDURE — 3008F BODY MASS INDEX DOCD: CPT | Mod: CPTII,S$GLB,, | Performed by: ANESTHESIOLOGY

## 2019-03-20 PROCEDURE — 99999 PR PBB SHADOW E&M-EST. PATIENT-LVL III: CPT | Mod: PBBFAC,,, | Performed by: ANESTHESIOLOGY

## 2019-03-20 PROCEDURE — 3074F SYST BP LT 130 MM HG: CPT | Mod: CPTII,S$GLB,, | Performed by: ANESTHESIOLOGY

## 2019-03-20 PROCEDURE — 3078F PR MOST RECENT DIASTOLIC BLOOD PRESSURE < 80 MM HG: ICD-10-PCS | Mod: CPTII,S$GLB,, | Performed by: ANESTHESIOLOGY

## 2019-03-20 PROCEDURE — 99204 PR OFFICE/OUTPT VISIT, NEW, LEVL IV, 45-59 MIN: ICD-10-PCS | Mod: S$GLB,,, | Performed by: ANESTHESIOLOGY

## 2019-03-20 PROCEDURE — 3008F PR BODY MASS INDEX (BMI) DOCUMENTED: ICD-10-PCS | Mod: CPTII,S$GLB,, | Performed by: ANESTHESIOLOGY

## 2019-03-20 PROCEDURE — 99204 OFFICE O/P NEW MOD 45 MIN: CPT | Mod: S$GLB,,, | Performed by: ANESTHESIOLOGY

## 2019-03-20 PROCEDURE — 3078F DIAST BP <80 MM HG: CPT | Mod: CPTII,S$GLB,, | Performed by: ANESTHESIOLOGY

## 2019-03-20 PROCEDURE — 99999 PR PBB SHADOW E&M-EST. PATIENT-LVL III: ICD-10-PCS | Mod: PBBFAC,,, | Performed by: ANESTHESIOLOGY

## 2019-03-20 PROCEDURE — 3074F PR MOST RECENT SYSTOLIC BLOOD PRESSURE < 130 MM HG: ICD-10-PCS | Mod: CPTII,S$GLB,, | Performed by: ANESTHESIOLOGY

## 2019-03-20 NOTE — PROGRESS NOTES
This note was completed with dictation software and grammatical errors may exist.    Referring Physician: Red Rdz MD    PCP: Red Rdz MD      CC:  Low back and leg pain    HPI:   Cholo Nogueira is a 58 y.o. male  with a history of sternal clavicular osteomyelitis, lumbar degenerative disease who presents in referral from Dr. Rdz for low back and leg pain. He has seen my colleague Dr. Lawrence in [place] in 2015.  He presents to us with constant aching, throbbing pain in his lower back.  Pain radiates his bilateral legs with standing and walking and lifting.  Pain improves with rest.  He recently had updated lumbar MRI.  He has tried physical therapy in the past with minimal benefit.  He has undergone lumbar SHAYE in the past with mild-to-moderate benefit.  He has not had any recent interventions.  He has been managed with Suboxone due to history of opioid dependence in the past.  He denies any worsening weakness.  No bowel bladder changes.     Pain intervention history from Dr. Lawrence: He has been seen by a [facility]surgeon at [facility], surgery has been discussed.  He reports being started on methadone and hydromorphone during his hospital stay for the osteomyelitis and was then sent to Dr. Ackerman at [facility] who promotes increasing doses of opioids and was at that time ramped up.  However for the last year he states that he has been taking methadone 20 mg 4 times a day and 8 mg of Dilaudid 4 times a day.  He has also been taking amitriptyline 25 mg up to 3 times a day.  He is taking Xanax 0.5 mg twice daily for anxiety.  He is status post a left L3 transforaminal epidural steroid injection on 5/6/14 with 50% relief.  He is status post left L3 transforaminal epidural steroid injection on 8/4/14 initially with 60% relief, now reporting 30% relief.  He reports that morphine made him sick.  He is status post L5/S1 interlaminar epidural steroid injection to the right on 12/22/14 reporting 25% relief, however states  this worked better than the other injections and reported 50 and 60% relief then.  He is status post L5/S1 interlaminar epidural steroid injection on 3/13/15 with greater than 50% relief.  He reports that this injection has helped more than any of the other injections he had in the past.    ROS:  CONSTITUTIONAL: No fevers, chills, night sweats, wt. loss, appetite changes  SKIN: no rashes or itching  ENT: No headaches, head trauma, vision changes, or eye pain  LYMPH NODES: None noticed   CV: No chest pain, palpitations.   RESP: No shortness of breath, dyspnea on exertion, cough, wheezing, or hemoptysis  GI: No nausea, emesis, diarrhea, constipation, melena, hematochezia, pain.    : No dysuria, hematuria, urgency, or frequency   HEME: No easy bruising, bleeding problems  PSYCHIATRIC: No depression, anxiety, psychosis, hallucinations.  NEURO: No seizures, memory loss, dizziness or difficulty sleeping  MSK: no joint pain      Past Medical History:   Diagnosis Date    Anticoagulant long-term use     ASA daily, very low dose, 1/4 of 325mg    Anxiety     Arthritis     Bacteremia     with acute kidney failure    DDD (degenerative disc disease), lumbar     Depression     [place] killed in [facility] [place]    GERD (gastroesophageal reflux disease)     associated with ASA use    Hepatitis B     History of liver failure     Low back pain 5/2/2012    Lumbar vertebral fracture 1985    Mobility impaired     Back pain and chest weakness, using walker    Nephrolithiasis     Neuralgia     Osteomyelitis     Abscess of Bilatteral Steroclavicular joints    Peptic ulcer disease      Past Surgical History:   Procedure Laterality Date    Epidural Steroid injection      Pain management    SHAYE-TRANSFORAMINAL Left 8/4/2014    [facility]    SHAYE-TRANSFORAMINAL Left 5/6/2014    [facility]    I&D sternoclavicular joint abscesses      3/2012 -4/2012, 3 surgeries,  "wound vac    INJECTION-STEROID-EPIDURAL-LUMBAR N/A 3/13/2015    [facility]    INJECTION-STEROID-EPIDURAL-LUMBAR, L5/S1 to right N/A 12/22/2014    [facility]    TONSILLECTOMY       Family History   Problem Relation Age of Onset    Diabetes Mother     Cholelithiasis Mother     Cancer Mother         Thyroid    Alcohol abuse Mother     Stroke Mother     Diabetes Maternal Grandfather     Alcohol abuse Father      Social History     Socioeconomic History    Marital status:      Spouse name: Not on file    Number of children: Not on file    Years of education: Not on file    Highest education level: Not on file   Social Needs    Financial resource strain: Not on file    Food insecurity - worry: Not on file    Food insecurity - inability: Not on file    Transportation needs - medical: Not on file    Transportation needs - non-medical: Not on file   Occupational History    Not on file   Tobacco Use    Smoking status: Current Every Day Smoker     Packs/day: 0.50     Years: 20.00     Pack years: 10.00     Types: Cigarettes     Start date: 7/28/1978    Smokeless tobacco: Never Used   Substance and Sexual Activity    Alcohol use: No    Drug use: No     Comment: Past history of narcotic dependence.  He denies IV use/ methadone    Sexual activity: Not on file   Other Topics Concern    Not on file   Social History Narrative    Not on file         Medications/Allergies: See med card    Vitals:    03/20/19 0951   BP: 112/74   Pulse: 84   Weight: 117.5 kg (259 lb)   Height: 5' 11" (1.803 m)   PainSc:   8   PainLoc: Back         Physical exam:    GENERAL: A and O x3, the patient appears well groomed and is in no acute distress.  Skin: No rashes or obvious lesions  HEENT: normocephalic, atraumatic  CARDIOVASCULAR:  Palpable peripheral pulses  LUNGS: easy work of breathing  ABDOMEN: soft, nontender   UPPER EXTREMITIES: Normal " alignment, normal range of motion, no atrophy, no skin changes,  hair growth and nail growth normal and equal bilaterally. No swelling, no tenderness.    LOWER EXTREMITIES:  Normal alignment, normal range of motion, no atrophy, no skin changes,  hair growth and nail growth normal and equal bilaterally. No swelling, no tenderness.    LUMBAR SPINE  Lumbar spine: ROM is limited with flexion extension and oblique extension with moderate increased pain.    Shant's test causes no increased pain on either side.    Supine straight leg raise is positive on left at 60°  Internal and external rotation of the hip causes no increased pain on either side.  Myofascial exam: No tenderness to palpation across lumbar paraspinous muscles.      MENTAL STATUS: normal orientation, speech, language, and fund of knowledge for social situation.  Emotional state appropriate.    CRANIAL NERVES:  II:  PERRL bilaterally,   III,IV,VI: EOMI.    V:  Facial sensation equal bilaterally  VII:  Facial motor function normal.  VIII:  Hearing equal to finger rub bilaterally  IX/X: Gag normal, palate symmetric  XI:  Shoulder shrug equal, head turn equal  XII:  Tongue midline without fasciculations      MOTOR: Tone and bulk: normal bilateral upper and lower Strength: normal   Delt Bi Tri WE WF     R 5 5 5 5 5 5   L 5 5 5 5 5 5     IP ADD ABD Quad TA Gas HAM  R 5 5 5 5 5 5 5  L 5 5 5 5 5 5 5    SENSATION: Light touch and pinprick intact bilaterally  REFLEXES: normal, symmetric, nonbrisk.  Toes down, no clonus. No hoffmans.  GAIT:  Antalgic gait.  Uses cane for assistance      Imaging:  MRI lumbar spine 03/2019  L3/L4: There is a left paracentral disc extrusion with distortion of the left anterolateral canal.  There is mild left foraminal narrowing and mild degenerative facet changes are noted bilaterally.    L4/L5: There is mild annular disc bulging there is moderate degenerative facet disease.  The central canal is minimally distorted due to disc  bulging.    L5/S1: Moderate degenerative facet changes are noted on the left and there is a small central disc extrusion and a small left posterolateral disc extrusion.  The exiting left L4 nerve root is contacted.      Assessment:  Patient referred for low back and leg pain  1. DDD (degenerative disc disease), lumbar    2. Lumbar radiculitis    3. Uncomplicated opioid dependence          Plan:  1. I have stressed the importance of physical activity and exercise to improve overall health  2. Reviewed pertinent imaging and records with patient  3. Discuss performing repeat ESIs as it has helped in the past for his back and leg pain.  He wishes to consider this in the future  4. As for his medications, we do not prescribed suboxone so we are unable to continue such medications.  Furthermore, he has long history of opioid dependence and would not recommend restarting him on opioid medications  5. He will follow up as needed      Thank you for referring this interesting patient, and I look forward to continuing to collaborate in his care.

## 2019-04-01 RX ORDER — IPRATROPIUM BROMIDE AND ALBUTEROL SULFATE 2.5; .5 MG/3ML; MG/3ML
3 SOLUTION RESPIRATORY (INHALATION) EVERY 6 HOURS PRN
Qty: 1 BOX | Refills: 0 | Status: SHIPPED | OUTPATIENT
Start: 2019-04-01 | End: 2019-06-06 | Stop reason: SDUPTHER

## 2019-04-01 NOTE — TELEPHONE ENCOUNTER
----- Message from Nunu Perez sent at 4/1/2019 10:39 AM CDT -----  Type:  RX Refill Request    Who Called:  Patient  RX Name and Strength:  albuterol-ipratropium (DUO-NEB) 2.5 mg-0.5 mg/3 mL nebulizer solution  Preferred Pharmacy with phone number:  [facility] on [address]  Best Call Back Number:  [phone]  Additional Information:  Patient stated he needs more ordered

## 2019-04-01 NOTE — TELEPHONE ENCOUNTER
Pt states that he is out of nebulizer treatments and said that they have really helped and asked if he could have a refill.  He has been using nebs TID. Confirmed with pharmacy that he picked them up on 3/16/19.  Please advise for refill.

## 2019-04-05 ENCOUNTER — OFFICE VISIT (OUTPATIENT)
Dept: FAMILY MEDICINE | Facility: CLINIC | Age: 59
End: 2019-04-05
Payer: MEDICARE

## 2019-04-05 ENCOUNTER — TELEPHONE (OUTPATIENT)
Dept: FAMILY MEDICINE | Facility: CLINIC | Age: 59
End: 2019-04-05

## 2019-04-05 VITALS
SYSTOLIC BLOOD PRESSURE: 116 MMHG | HEIGHT: 71 IN | HEART RATE: 91 BPM | DIASTOLIC BLOOD PRESSURE: 82 MMHG | TEMPERATURE: 99 F | BODY MASS INDEX: 33.97 KG/M2 | WEIGHT: 242.63 LBS | OXYGEN SATURATION: 93 %

## 2019-04-05 DIAGNOSIS — J44.1 COPD EXACERBATION: Primary | ICD-10-CM

## 2019-04-05 PROCEDURE — 99214 OFFICE O/P EST MOD 30 MIN: CPT | Mod: S$GLB,,, | Performed by: NURSE PRACTITIONER

## 2019-04-05 PROCEDURE — 3008F PR BODY MASS INDEX (BMI) DOCUMENTED: ICD-10-PCS | Mod: CPTII,S$GLB,, | Performed by: NURSE PRACTITIONER

## 2019-04-05 PROCEDURE — 99999 PR PBB SHADOW E&M-EST. PATIENT-LVL IV: ICD-10-PCS | Mod: PBBFAC,,, | Performed by: NURSE PRACTITIONER

## 2019-04-05 PROCEDURE — 3079F DIAST BP 80-89 MM HG: CPT | Mod: CPTII,S$GLB,, | Performed by: NURSE PRACTITIONER

## 2019-04-05 PROCEDURE — 3008F BODY MASS INDEX DOCD: CPT | Mod: CPTII,S$GLB,, | Performed by: NURSE PRACTITIONER

## 2019-04-05 PROCEDURE — 99999 PR PBB SHADOW E&M-EST. PATIENT-LVL IV: CPT | Mod: PBBFAC,,, | Performed by: NURSE PRACTITIONER

## 2019-04-05 PROCEDURE — 99499 UNLISTED E&M SERVICE: CPT | Mod: S$GLB,,, | Performed by: NURSE PRACTITIONER

## 2019-04-05 PROCEDURE — 99499 RISK ADDL DX/OHS AUDIT: ICD-10-PCS | Mod: S$GLB,,, | Performed by: NURSE PRACTITIONER

## 2019-04-05 PROCEDURE — 99214 PR OFFICE/OUTPT VISIT, EST, LEVL IV, 30-39 MIN: ICD-10-PCS | Mod: S$GLB,,, | Performed by: NURSE PRACTITIONER

## 2019-04-05 PROCEDURE — 3074F SYST BP LT 130 MM HG: CPT | Mod: CPTII,S$GLB,, | Performed by: NURSE PRACTITIONER

## 2019-04-05 PROCEDURE — 3074F PR MOST RECENT SYSTOLIC BLOOD PRESSURE < 130 MM HG: ICD-10-PCS | Mod: CPTII,S$GLB,, | Performed by: NURSE PRACTITIONER

## 2019-04-05 PROCEDURE — 3079F PR MOST RECENT DIASTOLIC BLOOD PRESSURE 80-89 MM HG: ICD-10-PCS | Mod: CPTII,S$GLB,, | Performed by: NURSE PRACTITIONER

## 2019-04-05 RX ORDER — FLUTICASONE PROPIONATE AND SALMETEROL XINAFOATE 45; 21 UG/1; UG/1
2 AEROSOL, METERED RESPIRATORY (INHALATION) 2 TIMES DAILY
Qty: 12 G | Refills: 3 | Status: SHIPPED | OUTPATIENT
Start: 2019-04-05 | End: 2020-02-07 | Stop reason: SDUPTHER

## 2019-04-05 RX ORDER — TIZANIDINE 4 MG/1
TABLET ORAL
Refills: 0 | COMMUNITY
Start: 2019-03-29 | End: 2020-02-07 | Stop reason: SDUPTHER

## 2019-04-05 RX ORDER — DOXYCYCLINE 100 MG/1
100 CAPSULE ORAL 2 TIMES DAILY
Qty: 20 CAPSULE | Refills: 0 | Status: SHIPPED | OUTPATIENT
Start: 2019-04-05 | End: 2020-01-14

## 2019-04-05 RX ORDER — PREDNISONE 20 MG/1
20 TABLET ORAL DAILY
Qty: 5 TABLET | Refills: 0 | Status: SHIPPED | OUTPATIENT
Start: 2019-04-05 | End: 2019-04-10

## 2019-04-05 NOTE — TELEPHONE ENCOUNTER
----- Message from Melany Moya sent at 4/5/2019  8:07 AM CDT -----  Contact: self  Type: Needs Medical Advice    Who Called:  self  Symptoms (please be specific):  flu  How long has patient had these symptoms: discharged from [facility] on [facility]/19  Pharmacy name and phone #:  [facility]  Best Call Back Number: [phone]  Additional Information: patient has been on a nebulizer since leaving the hospital and he still not doing well. He has the fever and all the symptoms again. Patient would like to see about getting an antibiotic. Patient is allergic to Penicillin. Please call patient to advice. Patient would come in but it takes 3 days to get a medical bus to bring him. Thanks!    [facility] [address] [place] [address] & [address] [address]  Phone: [phone] Fax: [phone]

## 2019-04-05 NOTE — PROGRESS NOTES
This dictation has been generated using Modal Fluency Dictation some phonetic errors may occur. Please contact author for clarification if needed.     Problem List Items Addressed This Visit     None      Visit Diagnoses     COPD exacerbation    -  Primary          Orders Placed This Encounter    doxycycline (MONODOX) 100 MG capsule    fluticasone-salmeterol (ADVAIR HFA) 45-21 mcg/actuation HFAA     COPD exacerbation had antibiotic as above.  Continue nebulized therapy past.  Add maintenance therapies    Follow up if symptoms worsen or fail to improve.    ________________________________________________________________  ________________________________________________________________      Chief Complaint   Patient presents with    Cough     chest congestion     History of present illness  This 58 y.o. presents today for complaint of COPD exacerbation.  Patient notes cough and congestion. He had missed some doses of his nebulized therapy and notes exacerbation of symptoms.  He has been coughing up green sputum.  He has had shortness of breath and wheezing.  He has had hospital admit for COPD exacerbation without intubation recently.  Review of systems  No fever or chills  No sinus pain or pressure.  He does note sinus congestion.  Sore throat symptoms noted.  Earache resolved.  No nausea vomiting or diarrhea  Patient denies rash or itching  Past medical and social history reviewed.  Patient is new to me.  Follows loosely in the clinic.      Past Medical History:   Diagnosis Date    Anticoagulant long-term use     ASA daily, very low dose, 1/4 of 325mg    Anxiety     Arthritis     Bacteremia     with acute kidney failure    DDD (degenerative disc disease), lumbar     Depression     Son killed in [place]    GERD (gastroesophageal reflux disease)     associated with ASA use    Hepatitis B     History of liver failure     Low back pain 5/2/2012    Lumbar vertebral fracture 1985    Mobility impaired      Back pain and chest weakness, using walker    Nephrolithiasis     Neuralgia     Osteomyelitis     Abscess of Bilatteral Steroclavicular joints    Peptic ulcer disease        Past Surgical History:   Procedure Laterality Date    Epidural Steroid injection      Pain management    SHAYE-TRANSFORAMINAL Left 8/4/2014    [facility]    SHAYE-TRANSFORAMINAL Left 5/6/2014    [facility]    I&D sternoclavicular joint abscesses      3/2012 -4/2012, 3 surgeries, wound vac    INJECTION-STEROID-EPIDURAL-LUMBAR N/A 3/13/2015    [facility]    INJECTION-STEROID-EPIDURAL-LUMBAR, L5/S1 to right N/A 12/22/2014    [facility]    TONSILLECTOMY         Family History   Problem Relation Age of Onset    Diabetes Mother     Cholelithiasis Mother     Cancer Mother         Thyroid    Alcohol abuse Mother     Stroke Mother     Diabetes Maternal Grandfather     Alcohol abuse Father        Social History     Socioeconomic History    Marital status:      Spouse name: Not on file    Number of children: Not on file    Years of education: Not on file    Highest education level: Not on file   Occupational History    Not on file   Social Needs    Financial resource strain: Not on file    Food insecurity:     Worry: Not on file     Inability: Not on file    Transportation needs:     Medical: Not on file     Non-medical: Not on file   Tobacco Use    Smoking status: Former Smoker     Packs/day: 0.50     Years: 20.00     Pack years: 10.00     Types: Cigarettes     Start date: 7/28/1978    Smokeless tobacco: Never Used   Substance and Sexual Activity    Alcohol use: No    Drug use: No     Types: Benzodiazepines, Other-see comments, Hydromorphone     Comment: Past history of narcotic dependence.  He denies IV use/ methadone    Sexual activity: Not on file   Lifestyle    Physical  activity:     Days per week: Not on file     Minutes per session: Not on file    Stress: Not on file   Relationships    Social connections:     Talks on phone: Not on file     Gets together: Not on file     Attends Yarsani service: Not on file     Active member of club or organization: Not on file     Attends meetings of clubs or organizations: Not on file     Relationship status: Not on file   Other Topics Concern    Not on file   Social History Narrative    Not on file       Current Outpatient Medications   Medication Sig Dispense Refill    ascorbic acid (VITAMIN C) 500 MG tablet Take 1,000 mg by mouth once daily.       b complex vitamins capsule Take 1 capsule by mouth once daily.      buprenorphine-naloxone (SUBOXONE) 8-2 mg Film Take 2.5 Films per day 75 each 0    gabapentin (NEURONTIN) 600 MG tablet Take 1 tablet (600 mg total) by mouth 3 (three) times daily. 270 tablet 1    gabapentin (NEURONTIN) 600 MG tablet TAKE ONE TABLET BY MOUTH THREE TIMES DAILY 90 tablet 3    multivitamin (ONE DAILY MULTIVITAMIN) per tablet Take 1 tablet by mouth once daily.      tiZANidine (ZANAFLEX) 4 MG tablet TK 1 T PO QD  0    albuterol-ipratropium (DUO-NEB) 2.5 mg-0.5 mg/3 mL nebulizer solution Take 3 mLs by nebulization every 6 (six) hours as needed for Wheezing. Rescue 1 Box 0    ALPRAZolam (XANAX) 1 MG tablet Take 1 tablet (1 mg total) by mouth 2 (two) times daily. 180 tablet 1    aspirin (ECOTRIN) 81 MG EC tablet Take 81 mg by mouth.      doxycycline (MONODOX) 100 MG capsule Take 1 capsule (100 mg total) by mouth 2 (two) times daily. 20 capsule 0    fluticasone-salmeterol (ADVAIR HFA) 45-21 mcg/actuation HFAA Inhale 2 puffs into the lungs 2 (two) times daily. Controller 12 g 3    ondansetron (ZOFRAN-ODT) 4 MG TbDL DISSOLVE 1 TABLET(4 MG) ON THE TONGUE EVERY 12 HOURS AS NEEDED 20 tablet 0     No current facility-administered medications for this visit.        Review of patient's allergies indicates:    Allergen Reactions    Ms contin [morphine] Nausea Only    Soma [carisoprodol] Nausea And Vomiting    Tylenol [acetaminophen]      Liver damage    Ultram [tramadol] Nausea Only    Aspirin Other (See Comments)     CAN take low dose. Gets stomach ulcers and indigestion    Penicillins Rash       Physical examination  Vitals Reviewed  Gen. Well-dressed well-nourished patient looks sick not septic  Skin warm dry and intact.  No rashes noted.  HEENT.  TM intact bilateral with normal light reflex.  No mastoid tenderness during percussion.  Nares patent bilateral.  Pharynx is unremarkable.  No maxillary or frontal sinus tenderness when percussed.    Neck is supple without adenopathy  Chest.  Respirations are even unlabored.  Scattered wheezing noted during auscultation.  Cardiac regular rate and rhythm.  No chest wall adenopathy noted.  Neuro. Awake alert oriented x4.  Normal judgment and cognition noted.  Extremities no clubbing cyanosis or edema noted.     Call or return to clinic prn if these symptoms worsen or fail to improve as anticipated.

## 2019-04-05 NOTE — TELEPHONE ENCOUNTER
Pt c/o cough and congestion and requesting an antibiotic.  Appt scheduled today with Abelardo Sanchez NP to be evaluated.

## 2019-04-25 ENCOUNTER — PATIENT OUTREACH (OUTPATIENT)
Dept: ADMINISTRATIVE | Facility: HOSPITAL | Age: 59
End: 2019-04-25

## 2019-05-02 RX ORDER — ALPRAZOLAM 1 MG/1
TABLET ORAL
Qty: 180 TABLET | Refills: 0 | Status: SHIPPED | OUTPATIENT
Start: 2019-05-02 | End: 2019-07-29 | Stop reason: SDUPTHER

## 2019-05-08 ENCOUNTER — TELEPHONE (OUTPATIENT)
Dept: FAMILY MEDICINE | Facility: CLINIC | Age: 59
End: 2019-05-08

## 2019-05-08 NOTE — TELEPHONE ENCOUNTER
----- Message from Altagracia Agrawal sent at 5/8/2019  8:49 AM CDT -----  Contact: Cholo haines  Type: Needs Medical Advice    Who Called:  Cholo Zavala Call Back Number: [phone]  Additional Information: Pls call pt regarding getting a copy of his medical records for Dr Rivera/pain management  They will not see him until he has a copy of records.

## 2019-06-10 RX ORDER — IPRATROPIUM BROMIDE AND ALBUTEROL SULFATE 2.5; .5 MG/3ML; MG/3ML
SOLUTION RESPIRATORY (INHALATION)
Qty: 180 ML | Refills: 0 | Status: SHIPPED | OUTPATIENT
Start: 2019-06-10 | End: 2019-10-02 | Stop reason: SDUPTHER

## 2019-07-29 RX ORDER — ALPRAZOLAM 1 MG/1
TABLET ORAL
Qty: 180 TABLET | Refills: 0 | Status: SHIPPED | OUTPATIENT
Start: 2019-07-29 | End: 2019-09-04 | Stop reason: SDUPTHER

## 2019-07-29 NOTE — TELEPHONE ENCOUNTER
----- Message from Romi Vaughan sent at 7/29/2019 10:39 AM CDT -----  Contact: self  Patient requesting refill on ALPRAZolam (XANAX) 1 MG tablet       [facility] #[id number] - [address] & [address] [address]  Phone: [phone] Fax: [phone]      Patient contact is [phone] (home)         Patient not sure if he need to schedule appointment first

## 2019-08-01 RX ORDER — GABAPENTIN 600 MG/1
TABLET ORAL
Qty: 90 TABLET | Refills: 0 | Status: SHIPPED | OUTPATIENT
Start: 2019-08-01 | End: 2019-08-30 | Stop reason: SDUPTHER

## 2019-08-08 ENCOUNTER — PATIENT OUTREACH (OUTPATIENT)
Dept: ADMINISTRATIVE | Facility: HOSPITAL | Age: 59
End: 2019-08-08

## 2019-08-22 ENCOUNTER — PATIENT OUTREACH (OUTPATIENT)
Dept: ADMINISTRATIVE | Facility: HOSPITAL | Age: 59
End: 2019-08-22

## 2019-08-30 ENCOUNTER — TELEPHONE (OUTPATIENT)
Dept: FAMILY MEDICINE | Facility: CLINIC | Age: 59
End: 2019-08-30

## 2019-09-01 RX ORDER — GABAPENTIN 600 MG/1
TABLET ORAL
Qty: 90 TABLET | Refills: 0 | Status: SHIPPED | OUTPATIENT
Start: 2019-09-01 | End: 2019-09-04 | Stop reason: SDUPTHER

## 2019-09-03 NOTE — TELEPHONE ENCOUNTER
"Called patient to schedule patient for appointment. Patient stated started crying and stated "I am scheduled for tomorrow. I am in a lot of pain. I am having ringing in my ears, pain the goes up my right arm, numbness in my right thumb and index finger. I have a walker to help me because I am stooped over so much now. I am at Dr. Jason Justice's office right now. I am waiting on him to help me, but I am still coming to see Dr. Carlton's tomorrow." I stated " I will let Dr. Carlton's know about all of this and if your pain worsens or you develop any new symptoms please go to the ER." Patient voiced understanding.   "

## 2019-09-04 ENCOUNTER — TELEPHONE (OUTPATIENT)
Dept: FAMILY MEDICINE | Facility: CLINIC | Age: 59
End: 2019-09-04

## 2019-09-04 ENCOUNTER — OFFICE VISIT (OUTPATIENT)
Dept: FAMILY MEDICINE | Facility: CLINIC | Age: 59
End: 2019-09-04
Payer: MEDICARE

## 2019-09-04 VITALS
OXYGEN SATURATION: 96 % | DIASTOLIC BLOOD PRESSURE: 80 MMHG | WEIGHT: 212.5 LBS | TEMPERATURE: 98 F | BODY MASS INDEX: 29.75 KG/M2 | HEIGHT: 71 IN | SYSTOLIC BLOOD PRESSURE: 118 MMHG | HEART RATE: 72 BPM

## 2019-09-04 DIAGNOSIS — B18.1 CHRONIC VIRAL HEPATITIS B WITHOUT DELTA AGENT AND WITHOUT COMA: ICD-10-CM

## 2019-09-04 DIAGNOSIS — M54.12 CERVICAL RADICULOPATHY DUE TO TRAUMA: Primary | ICD-10-CM

## 2019-09-04 DIAGNOSIS — M54.2 NECK PAIN: ICD-10-CM

## 2019-09-04 DIAGNOSIS — R93.3 ABNORMAL FINDINGS ON DIAGNOSTIC IMAGING OF OTHER PARTS OF DIGESTIVE TRACT: ICD-10-CM

## 2019-09-04 PROCEDURE — 99499 RISK ADDL DX/OHS AUDIT: ICD-10-PCS | Mod: S$GLB,,, | Performed by: FAMILY MEDICINE

## 2019-09-04 PROCEDURE — 99999 PR PBB SHADOW E&M-EST. PATIENT-LVL V: ICD-10-PCS | Mod: PBBFAC,,, | Performed by: FAMILY MEDICINE

## 2019-09-04 PROCEDURE — 99214 PR OFFICE/OUTPT VISIT, EST, LEVL IV, 30-39 MIN: ICD-10-PCS | Mod: S$GLB,,, | Performed by: FAMILY MEDICINE

## 2019-09-04 PROCEDURE — 99499 UNLISTED E&M SERVICE: CPT | Mod: S$GLB,,, | Performed by: FAMILY MEDICINE

## 2019-09-04 PROCEDURE — 99999 PR PBB SHADOW E&M-EST. PATIENT-LVL V: CPT | Mod: PBBFAC,,, | Performed by: FAMILY MEDICINE

## 2019-09-04 PROCEDURE — 99214 OFFICE O/P EST MOD 30 MIN: CPT | Mod: S$GLB,,, | Performed by: FAMILY MEDICINE

## 2019-09-04 PROCEDURE — 3074F SYST BP LT 130 MM HG: CPT | Mod: CPTII,S$GLB,, | Performed by: FAMILY MEDICINE

## 2019-09-04 PROCEDURE — 3008F PR BODY MASS INDEX (BMI) DOCUMENTED: ICD-10-PCS | Mod: CPTII,S$GLB,, | Performed by: FAMILY MEDICINE

## 2019-09-04 PROCEDURE — 3079F PR MOST RECENT DIASTOLIC BLOOD PRESSURE 80-89 MM HG: ICD-10-PCS | Mod: CPTII,S$GLB,, | Performed by: FAMILY MEDICINE

## 2019-09-04 PROCEDURE — 3074F PR MOST RECENT SYSTOLIC BLOOD PRESSURE < 130 MM HG: ICD-10-PCS | Mod: CPTII,S$GLB,, | Performed by: FAMILY MEDICINE

## 2019-09-04 PROCEDURE — 3008F BODY MASS INDEX DOCD: CPT | Mod: CPTII,S$GLB,, | Performed by: FAMILY MEDICINE

## 2019-09-04 PROCEDURE — 3079F DIAST BP 80-89 MM HG: CPT | Mod: CPTII,S$GLB,, | Performed by: FAMILY MEDICINE

## 2019-09-04 RX ORDER — GABAPENTIN 800 MG/1
800 TABLET ORAL 3 TIMES DAILY
Qty: 270 TABLET | Refills: 2 | Status: SHIPPED | OUTPATIENT
Start: 2019-09-04 | End: 2019-11-08 | Stop reason: SDUPTHER

## 2019-09-04 RX ORDER — ALPRAZOLAM 1 MG/1
TABLET ORAL
Qty: 180 TABLET | Refills: 0 | Status: SHIPPED | OUTPATIENT
Start: 2019-09-04 | End: 2019-10-23 | Stop reason: SDUPTHER

## 2019-09-04 NOTE — PROGRESS NOTES
"Subjective:       Patient ID: Cholo Nogueira is a 59 y.o. male.    Chief Complaint: Follow-up    He came today because of a misunderstanding and perhaps transportation issues.  He has been experiencing physical and mental distress recently.  He says that he fell about 6-8 weeks ago.  He injured his right shoulder.  He complains of right arm numbness and weakness as well as right-sided neck pain. He sees Dr. Justice for Suboxone.  Dr. Vinh chandler on recommended that he get a cervical spine MRI.  Dr. Justice is not on [facility] list.  Cholo has previously seen [facility] pain management.  He is asking for an increase in his Xanax dose, being put on narcotic pain medication, increasing his gabapentin dose.    Review of Systems   Constitutional: Negative for fever and unexpected weight change.   Respiratory: Negative for shortness of breath.         His COPD has been doing better.  He managed to stop smoking for little while but has recently restarted 2-3 cigarettes daily.   Musculoskeletal: Positive for arthralgias, back pain and neck pain.   Neurological: Positive for weakness and numbness.       Objective:     Blood pressure 118/80, pulse 72, temperature 98.4 °F (36.9 °C), temperature source Oral, height 5' 11" (1.803 m), weight 96.4 kg (212 lb 8.4 oz), SpO2 96 %.      Physical Exam   Constitutional: He appears well-developed.   He is overweight and limps with a walker.  Mild to moderate distress. Tearful at times.  Cheerful at other times.   Neck:   He has limited movement in his neck.  There is some tenderness on the right cervical muscles.   Pulmonary/Chest: Effort normal and breath sounds normal. No respiratory distress.   Musculoskeletal:   He seems to have less tone and some wasting in his right upper trapezius.   Neurological: He is alert.   Weakness in the right  strength, wrist flexure, elbow flexor and extensor and shoulder abductor.  Decreased sensation dorsal thumb and index and medial forearm. "   Skin:   He has a verrucous papule under his left eyelid.  He said he has had it since childhood but it has grown over the past 10 years.       Assessment:       1. Cervical radiculopathy due to trauma    2. Neck pain    3. Chronic viral hepatitis B without delta agent and without coma    4. Abnormal findings on diagnostic imaging of other parts of digestive tract         Plan:       I refilled Xanax 1 mg b.i.d..  Increase gabapentin to 800 mg t.i.d..  Cervical spine MRI and neuro surgery consult.  He should call [facility] to find out who can prescribe Suboxone for him.

## 2019-09-04 NOTE — TELEPHONE ENCOUNTER
----- Message from Karie Mehta sent at 9/4/2019 11:40 AM CDT -----  Patient [phone] is calling/he was just seen today and saying that Dr Rdz was going to change Alprazolam 1mg - take one twice daily  -  To either Alprazolam 2mg - take one twice daily or Alprazolam 1mg - take one three times daily/patient is waiting at the pharmacy and asking to please call pharmacy to verify as he can not get the Alprazolam filled/      [facility] #[id number] [address] & [address] [address]  Phone: [phone] Fax: [phone]

## 2019-09-04 NOTE — TELEPHONE ENCOUNTER
Spoke with pt, advised Dr Rdz did not increase xanax, advised gabapentin was increased  Voiced understanding

## 2019-09-06 ENCOUNTER — TELEPHONE (OUTPATIENT)
Dept: FAMILY MEDICINE | Facility: CLINIC | Age: 59
End: 2019-09-06

## 2019-09-06 NOTE — TELEPHONE ENCOUNTER
----- Message from Hilda Squires sent at 9/6/2019 11:56 AM CDT -----  Contact: patient  Type: Needs Medical Advice    Who Called:  patient  Symptoms (please be specific):  na  How long has patient had these symptoms:  gutierrez  Pharmacy name and phone #:  [facility]  Best Call Back Number: [phone]  Additional Information: Patient needs his records faxed to  in [place], Fax#[phone]. Please call to advise.Thanks!

## 2019-09-11 ENCOUNTER — HOSPITAL ENCOUNTER (OUTPATIENT)
Dept: RADIOLOGY | Facility: HOSPITAL | Age: 59
Discharge: HOME | End: 2019-09-11
Attending: FAMILY MEDICINE
Payer: MEDICARE

## 2019-09-11 DIAGNOSIS — M54.2 NECK PAIN: ICD-10-CM

## 2019-09-11 PROCEDURE — 72141 MRI NECK SPINE W/O DYE: CPT | Mod: TC

## 2019-09-11 PROCEDURE — 72141 MRI NECK SPINE W/O DYE: CPT | Mod: 26,,, | Performed by: RADIOLOGY

## 2019-09-11 PROCEDURE — 72141 MRI CERVICAL SPINE WITHOUT CONTRAST: ICD-10-PCS | Mod: 26,,, | Performed by: RADIOLOGY

## 2019-10-02 RX ORDER — IPRATROPIUM BROMIDE AND ALBUTEROL SULFATE 2.5; .5 MG/3ML; MG/3ML
SOLUTION RESPIRATORY (INHALATION)
Qty: 180 ML | Refills: 0 | Status: SHIPPED | OUTPATIENT
Start: 2019-10-02 | End: 2020-05-15 | Stop reason: SDUPTHER

## 2019-10-22 ENCOUNTER — OFFICE VISIT (OUTPATIENT)
Dept: NEUROSURGERY | Facility: CLINIC | Age: 59
End: 2019-10-22
Payer: MEDICARE

## 2019-10-22 VITALS
WEIGHT: 212.5 LBS | HEART RATE: 80 BPM | SYSTOLIC BLOOD PRESSURE: 150 MMHG | BODY MASS INDEX: 29.75 KG/M2 | HEIGHT: 71 IN | DIASTOLIC BLOOD PRESSURE: 102 MMHG

## 2019-10-22 DIAGNOSIS — Z72.0 TOBACCO ABUSE: ICD-10-CM

## 2019-10-22 DIAGNOSIS — M54.2 NECK PAIN, ACUTE: ICD-10-CM

## 2019-10-22 DIAGNOSIS — M25.511 ACUTE PAIN OF RIGHT SHOULDER: ICD-10-CM

## 2019-10-22 DIAGNOSIS — M54.16 LUMBAR RADICULOPATHY: ICD-10-CM

## 2019-10-22 DIAGNOSIS — G56.01 RIGHT CARPAL TUNNEL SYNDROME: Primary | ICD-10-CM

## 2019-10-22 DIAGNOSIS — F11.24 OPIOID DEPENDENCE WITH OPIOID-INDUCED MOOD DISORDER: ICD-10-CM

## 2019-10-22 PROCEDURE — 3077F PR MOST RECENT SYSTOLIC BLOOD PRESSURE >= 140 MM HG: ICD-10-PCS | Mod: CPTII,S$GLB,, | Performed by: NEUROLOGICAL SURGERY

## 2019-10-22 PROCEDURE — 99999 PR PBB SHADOW E&M-EST. PATIENT-LVL IV: CPT | Mod: PBBFAC,,, | Performed by: NEUROLOGICAL SURGERY

## 2019-10-22 PROCEDURE — 3077F SYST BP >= 140 MM HG: CPT | Mod: CPTII,S$GLB,, | Performed by: NEUROLOGICAL SURGERY

## 2019-10-22 PROCEDURE — 99999 PR PBB SHADOW E&M-EST. PATIENT-LVL IV: ICD-10-PCS | Mod: PBBFAC,,, | Performed by: NEUROLOGICAL SURGERY

## 2019-10-22 PROCEDURE — 3080F DIAST BP >= 90 MM HG: CPT | Mod: CPTII,S$GLB,, | Performed by: NEUROLOGICAL SURGERY

## 2019-10-22 PROCEDURE — 3008F BODY MASS INDEX DOCD: CPT | Mod: CPTII,S$GLB,, | Performed by: NEUROLOGICAL SURGERY

## 2019-10-22 PROCEDURE — 99499 UNLISTED E&M SERVICE: CPT | Mod: S$GLB,,, | Performed by: NEUROLOGICAL SURGERY

## 2019-10-22 PROCEDURE — 99204 OFFICE O/P NEW MOD 45 MIN: CPT | Mod: S$GLB,,, | Performed by: NEUROLOGICAL SURGERY

## 2019-10-22 PROCEDURE — 3080F PR MOST RECENT DIASTOLIC BLOOD PRESSURE >= 90 MM HG: ICD-10-PCS | Mod: CPTII,S$GLB,, | Performed by: NEUROLOGICAL SURGERY

## 2019-10-22 PROCEDURE — 3008F PR BODY MASS INDEX (BMI) DOCUMENTED: ICD-10-PCS | Mod: CPTII,S$GLB,, | Performed by: NEUROLOGICAL SURGERY

## 2019-10-22 PROCEDURE — 99499 RISK ADDL DX/OHS AUDIT: ICD-10-PCS | Mod: S$GLB,,, | Performed by: NEUROLOGICAL SURGERY

## 2019-10-22 PROCEDURE — 99204 PR OFFICE/OUTPT VISIT, NEW, LEVL IV, 45-59 MIN: ICD-10-PCS | Mod: S$GLB,,, | Performed by: NEUROLOGICAL SURGERY

## 2019-10-22 NOTE — LETTER
November 20, 2019      Red Rdz MD  [address]  [facility] [address]           [place] - Neurosurgery  [address] [place] [address]  Phone: [phone]  Fax: [phone]          Patient: Cholo Nogueira   MR Number: [id number]   YOB: 1960   Date of Visit: 10/22/2019       Dear Dr. Red Rdz:    Thank you for referring Cholo Nogueira to me for evaluation. Attached you will find relevant portions of my assessment and plan of care.    If you have questions, please do not hesitate to call me. I look forward to following Cholo Nogueira along with you.    Sincerely,    Veronica Gamboa MD    Enclosure  CC:  No Recipients    If you would like to receive this communication electronically, please contact [email or web address] or [phone] to request more information on [employer]® Link access.    For providers and/or their staff who would like to refer a patient to [facility], please contact us through our one-stop-shop provider referral line, [facility], at [phone].    If you feel you have received this communication in error or would no longer like to receive these types of communications, please e-mail [email or web address]

## 2019-10-22 NOTE — PROGRESS NOTES
[facility]   [place]    Patient ID: Cholo Nogueira is a 59 y.o. male.    Chief Complaint   Patient presents with    Cervical Spine Pain (C-spine)     Neck pain since 4 months ago when he fell through a wooden ramp. States pain radiates down R arm with numbness to fingers.Ringing in his ears. Pain is aggrevated by walking. Patient states nothing helps with his pain. History of SHAYE with little relief. Oswestery=74 PHQ= 5       Review of Systems   Constitutional: Positive for activity change.   HENT: Positive for tinnitus.    Musculoskeletal: Positive for arthralgias, back pain, gait problem, myalgias, neck pain and neck stiffness.   Neurological: Positive for weakness and numbness.   Psychiatric/Behavioral: Positive for decreased concentration, dysphoric mood and sleep disturbance. The patient is nervous/anxious.        Past Medical History:   Diagnosis Date    Anticoagulant long-term use     ASA daily, very low dose, 1/4 of 325mg    Anxiety     Arthritis     Bacteremia     with acute kidney failure    DDD (degenerative disc disease), lumbar     Depression     Son killed in [place]    GERD (gastroesophageal reflux disease)     associated with ASA use    Hepatitis B     History of liver failure     Low back pain 5/2/2012    Lumbar vertebral fracture 1985    Mobility impaired     Back pain and chest weakness, using walker    Nephrolithiasis     Neuralgia     Osteomyelitis     Abscess of Bilatteral Steroclavicular joints    Peptic ulcer disease      Past Surgical History:   Procedure Laterality Date    Epidural Steroid injection      Pain management    I&D sternoclavicular joint abscesses      3/2012 -4/2012, 3 surgeries, wound vac    TONSILLECTOMY       Social History     Socioeconomic History    Marital status:      Spouse name: Not on file    Number of children: Not on file    Years of education: Not on file    Highest education level: Not on file   Occupational History     Not on file   Social Needs    Financial resource strain: Not on file    Food insecurity:     Worry: Not on file     Inability: Not on file    Transportation needs:     Medical: Not on file     Non-medical: Not on file   Tobacco Use    Smoking status: Former Smoker     Packs/day: 0.50     Years: 20.00     Pack years: 10.00     Types: Cigarettes     Start date: 7/28/1978    Smokeless tobacco: Never Used   Substance and Sexual Activity    Alcohol use: No    Drug use: No     Types: Benzodiazepines, Other-see comments, Hydromorphone     Comment: Past history of narcotic dependence.  He denies IV use/ methadone    Sexual activity: Not on file   Lifestyle    Physical activity:     Days per week: Not on file     Minutes per session: Not on file    Stress: Not on file   Relationships    Social connections:     Talks on phone: Not on file     Gets together: Not on file     Attends [employer] service: Not on file     Active member of club or organization: Not on file     Attends meetings of clubs or organizations: Not on file     Relationship status: Not on file   Other Topics Concern    Not on file   Social History Narrative    Not on file     Family History   Problem Relation Age of Onset    Diabetes Mother     Cholelithiasis Mother     Cancer Mother         Thyroid    Alcohol abuse Mother     Stroke Mother     Diabetes Maternal Grandfather     Alcohol abuse Father      Review of patient's allergies indicates:   Allergen Reactions    Ms contin [morphine] Nausea Only    Soma [carisoprodol] Nausea And Vomiting    Tylenol [acetaminophen]      Liver damage    Ultram [tramadol] Nausea Only    Aspirin Other (See Comments)     CAN take low dose. Gets stomach ulcers and indigestion    Penicillins Rash       Current Outpatient Medications:     albuterol-ipratropium (DUO-NEB) 2.5 mg-0.5 mg/3 mL nebulizer solution, USE 1 VIAL VIA NEBULIZER EVERY 6 HOURS AS NEEDED FOR WHEEZING( RESCUE), Disp: 180 mL, Rfl: 0     "ALPRAZolam (XANAX) 1 MG tablet, TAKE 1 TABLET(1 MG) BY MOUTH TWICE DAILY, Disp: 180 tablet, Rfl: 0    ascorbic acid (VITAMIN C) 500 MG tablet, Take 1,000 mg by mouth once daily. , Disp: , Rfl:     aspirin (ECOTRIN) 81 MG EC tablet, Take 81 mg by mouth., Disp: , Rfl:     b complex vitamins capsule, Take 1 capsule by mouth once daily., Disp: , Rfl:     buprenorphine-naloxone (SUBOXONE) 8-2 mg Film, Take 2.5 Films per day, Disp: 75 each, Rfl: 0    fluticasone-salmeterol (ADVAIR HFA) 45-21 mcg/actuation HFAA, Inhale 2 puffs into the lungs 2 (two) times daily. Controller, Disp: 12 g, Rfl: 3    gabapentin (NEURONTIN) 800 MG tablet, Take 1 tablet (800 mg total) by mouth 3 (three) times daily., Disp: 270 tablet, Rfl: 2    multivitamin (ONE DAILY MULTIVITAMIN) per tablet, Take 1 tablet by mouth once daily., Disp: , Rfl:     ondansetron (ZOFRAN-ODT) 4 MG TbDL, DISSOLVE 1 TABLET(4 MG) ON THE TONGUE EVERY 12 HOURS AS NEEDED, Disp: 20 tablet, Rfl: 0    tiZANidine (ZANAFLEX) 4 MG tablet, TK 1 T PO QD, Disp: , Rfl: 0    doxycycline (MONODOX) 100 MG capsule, Take 1 capsule (100 mg total) by mouth 2 (two) times daily. (Patient not taking: Reported on 10/22/2019), Disp: 20 capsule, Rfl: 0    Vitals:    10/22/19 1021   BP: (!) 150/102   Pulse: 80   Weight: 96.4 kg (212 lb 8.4 oz)   Height: 5' 11" (1.803 m)   PainSc: 10-Worst pain ever      Estimated body mass index is 29.64 kg/m² as calculated from the following:    Height as of this encounter: 5' 11" (1.803 m).    Weight as of this encounter: 96.4 kg (212 lb 8.4 oz).    Physical Exam   Constitutional: He is oriented to person, place, and time. He appears well-developed and well-nourished.   HENT:   Head: Normocephalic and atraumatic.   Eyes: Pupils are equal, round, and reactive to light. EOM are normal.   Neck: Normal range of motion. Neck supple.   Cardiovascular: Normal rate and regular rhythm.   Pulmonary/Chest: Effort normal.   Abdominal: Soft.   Musculoskeletal: " Normal range of motion.   Neurological: He is alert and oriented to person, place, and time. He has an abnormal Romberg Test and an abnormal Tandem Gait Test. He has a normal Finger-Nose-Finger Test.   Reflex Scores:       Tricep reflexes are 1+ on the right side and 1+ on the left side.       Bicep reflexes are 1+ on the right side and 1+ on the left side.       Brachioradialis reflexes are 1+ on the right side and 1+ on the left side.       Patellar reflexes are 1+ on the right side and 1+ on the left side.       Achilles reflexes are 1+ on the right side and 1+ on the left side.  Skin: Skin is warm and dry.   Psychiatric: He has a normal mood and affect. His speech is normal and behavior is normal. Judgment and thought content normal.   Nursing note and vitals reviewed.      Neurologic Exam     Mental Status   Oriented to person, place, and time.   Attention: normal. Concentration: normal.   Speech: speech is normal   Level of consciousness: alert  Knowledge: good.     Cranial Nerves     CN II   Visual acuity: normal    CN III, IV, VI   Pupils are equal, round, and reactive to light.  Extraocular motions are normal.     CN V   Facial sensation intact.     CN VII   Facial expression full, symmetric.     CN VIII   Hearing: intact    CN IX, X   Palate: symmetric    CN XI   CN XI normal.     CN XII   CN XII normal.     Motor Exam   Muscle bulk: normal  Overall muscle tone: normal  Right arm pronator drift: absent  Left arm pronator drift: absent    Strength   Right deltoid: 5/5  Left deltoid: 5/5  Right biceps: 5/5  Left biceps: 5/5  Right triceps: 5/5  Left triceps: 5/5  Right wrist flexion: 5/5  Left wrist flexion: 5/5  Right wrist extension: 5/5  Left wrist extension: 5/5  Right interossei: 5/5  Left interossei: 5/5  Right iliopsoas: 5/5  Left iliopsoas: 4/5  Right quadriceps: 5/5  Left quadriceps: 4/5  Right hamstrin/5  Left hamstrin/5  Right anterior tibial: 5/5  Left anterior tibial: 5/5  Right  posterior tibial: 5/5  Left posterior tibial: 5/5  Right peroneal: 5/5  Left peroneal: 5/5  Right gastroc: 5/5  Left gastroc: 5/5    Sensory Exam   Light touch normal.   Sensory deficit distribution on right: median  Sensory deficit distribution on left: L5    Gait, Coordination, and Reflexes     Gait  Gait: circumduction (stooped posture, wide based gait with dragging left leg)    Coordination   Romberg: positive  Finger to nose coordination: normal  Tandem walking coordination: abnormal    Tremor   Resting tremor: absent    Reflexes   Right brachioradialis: 1+  Left brachioradialis: 1+  Right biceps: 1+  Left biceps: 1+  Right triceps: 1+  Left triceps: 1+  Right patellar: 1+  Left patellar: 1+  Right achilles: 1+  Left achilles: 1+  Right : 1+  Left : 2+  Right plantar: normal  Left plantar: normal  Right Titus: absent  Left Titus: absent  Right ankle clonus: absent  Left ankle clonus: absent        Visit Diagnosis:  Right carpal tunnel syndrome    Lumbar radiculopathy    Opioid dependence with opioid-induced mood disorder    Tobacco abuse    Neck pain, acute    Acute pain of right shoulder        Provider dictation:  Oswestry score: 74%  PHQ:  5    The patient is a 59-year-old  male smoker that is right-hand dominant presenting today for multiple complaints.  He he reports a complex surgical history in which he underwent osteotomy of bilateral clavicles, a left rib a as well as a thoracic neurectomies a secondary to osteomyelitis.  This was done by Dr. Martinez and patient reports that was an 18 hr surgery.  He continues to complain of ringing in his ears that he associates with the pain in his cervical spine.  He has a complex pain history in which she was seeing Dr. Sena however because he required Suboxone prescriptions he was referred out.  There was consideration of a pain pump however the patient did not qualify could not afford the medications.  He currently sees both Dr. Justice and   Kendell in primary care.  He has both benzodiazepine as well as opiate dependence.  He is complaining today of severe lumbar back pain with radiation to the left leg to the foot.  He reports that he is unable to ambulate secondary to the pain; however he did walk with a walker into the clinic today.  He reports a history of a spine fracture years ago.  He is also complaining of numbness in the thumb index and middle finger. It ss worse at night.  He has not worn a brace.  He is worse with sleeping on the right side. No history of Carpal tunnel in the past, but he was a  and feels that may have contributed.  He has not done physical therapy for his neck, lumbar spine or his hand.     He does have a history of tobacco abuse which has improved with the use of Wellbutrin.  He denies alcohol use or recreational drug use.    On physical examination, he has limitation in shoulder range of motion secondary to pain in the right shoulder.  He has a positive Tinel's and Durkins on the right wrist.  No thenar atrophy appreciated. Normal opposition.  Intrinsics are intact.  AIN and PIN intact.  The patient walks with severe sagittal balance.  He is unable to ambulate without dragging his left leg. On examination seated, he has only mild 4+ out of 5 hip flexion weakness and no distal weakness. He has sensory deficits subjectively in the entire left LE in a non dermal distribution on the left compared to right.  He has diminished muscle stretch reflexes in both the upper and lower extremity. Negative godfrey's and clonus.     MRI of the cervical spine dated 09/11/2019 independently reviewed.  There is no significant central stenosis.  At C3-4 there is a very small right paracentral disc herniation as well as a small perineural cyst without significant foraminal stenosis.  Otherwise normal MRI for age.    MRI of the lumbar spine dated 03/4/ 2019 independently reviewed.  The MRI is normal for age except for a left paracentral  disc herniation causing severe lateral recess and mild foraminal stenosis at L3-4.  The disc herniation does appear to contact the traversing L4 nerve root on the left.      At this time, the pathology and imaging have been extensively reviewed with the patient.  He likely has carpal tunnel syndrome in the right wrist.  I have recommended bracing as well as therapy.  He should also have some physical therapy on the cervical and lumbar spine as well as right shoulder.  He should continue to see Dr. Gupta his pain management physician in [place].  He has an upcoming appointment with him this week.  We have also recommended injections at L3-4 left transforaminal to help with his pain.  At this time he needs to continue conservative management.  If he has no improvement of his symptoms will see him back and discuss further options.    This note was done using voice recognition software. Please excuse any errors missed in proof reading.

## 2019-10-23 RX ORDER — ALPRAZOLAM 1 MG/1
TABLET ORAL
Qty: 180 TABLET | Refills: 0 | Status: SHIPPED | OUTPATIENT
Start: 2019-10-23 | End: 2019-11-08 | Stop reason: SDUPTHER

## 2019-10-23 NOTE — TELEPHONE ENCOUNTER
----- Message from Isabella Jacobo sent at 10/23/2019  9:25 AM CDT -----  Contact: patient  Patient called to state he has moved and needs his meds changed to new pharmacy.    Please send in ALPRAZolam (XANAX) 1 MG tablet to:    [facility]  [address]  Phone- [phone]  Fax- [phone]      Patient call back: [phone]

## 2019-10-31 ENCOUNTER — TELEPHONE (OUTPATIENT)
Dept: FAMILY MEDICINE | Facility: CLINIC | Age: 59
End: 2019-10-31

## 2019-10-31 ENCOUNTER — TELEPHONE (OUTPATIENT)
Dept: NEUROSURGERY | Facility: CLINIC | Age: 59
End: 2019-10-31

## 2019-10-31 NOTE — TELEPHONE ENCOUNTER
Sent result from last MRI and LOV to Dr. Gupta. Advised if patient is requesting results from multiple visits from Multiple providers, he will need to send MELINDA to HIM for processing. Understanding verbalized.

## 2019-10-31 NOTE — TELEPHONE ENCOUNTER
----- Message from Justino Luna sent at 10/31/2019  1:26 PM CDT -----  Contact: pt   Pt checking on request he states he sent to nurse to have records sent to dr Gregorio Gupta         [phone]

## 2019-10-31 NOTE — TELEPHONE ENCOUNTER
----- Message from Justino Luna sent at 10/31/2019  1:26 PM CDT -----  Contact: pt   Pt checking on request he states he sent to nurse to have records sent to dr Gregorio Gupta         [phone]
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
## 2019-11-11 RX ORDER — GABAPENTIN 800 MG/1
800 TABLET ORAL 3 TIMES DAILY
Qty: 270 TABLET | Refills: 2 | Status: SHIPPED | OUTPATIENT
Start: 2019-11-11 | End: 2020-01-13 | Stop reason: SDUPTHER

## 2019-11-11 RX ORDER — ALPRAZOLAM 1 MG/1
TABLET ORAL
Qty: 180 TABLET | Refills: 0 | Status: SHIPPED | OUTPATIENT
Start: 2019-11-11 | End: 2019-11-11 | Stop reason: SDUPTHER

## 2019-11-11 RX ORDER — GABAPENTIN 800 MG/1
800 TABLET ORAL 3 TIMES DAILY
Qty: 270 TABLET | Refills: 2 | Status: SHIPPED | OUTPATIENT
Start: 2019-11-11 | End: 2019-11-11 | Stop reason: SDUPTHER

## 2019-11-11 RX ORDER — ALPRAZOLAM 1 MG/1
TABLET ORAL
Qty: 180 TABLET | Refills: 0 | Status: SHIPPED | OUTPATIENT
Start: 2019-11-11 | End: 2019-11-12 | Stop reason: SDUPTHER
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
## 2019-11-12 RX ORDER — ALPRAZOLAM 1 MG/1
TABLET ORAL
Qty: 180 TABLET | Refills: 0 | Status: SHIPPED | OUTPATIENT
Start: 2019-11-12 | End: 2020-01-13 | Stop reason: SDUPTHER
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
## 2019-11-12 NOTE — TELEPHONE ENCOUNTER
----- Message from Isabella Jacobo sent at 11/12/2019 10:18 AM CST -----  Contact: Patient  Patient called to state his script for ALPRAZolam (XANAX) 1 MG tablet  needs to be sent to [facility]   Phone#-[phone]      Patient call back: [phone]

## 2019-11-13 ENCOUNTER — TELEPHONE (OUTPATIENT)
Dept: FAMILY MEDICINE | Facility: CLINIC | Age: 59
End: 2019-11-13

## 2019-11-13 RX ORDER — GABAPENTIN 600 MG/1
TABLET ORAL
Qty: 90 TABLET | Refills: 0 | Status: SHIPPED | OUTPATIENT
Start: 2019-11-13 | End: 2020-01-13 | Stop reason: SDUPTHER
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
## 2019-12-04 ENCOUNTER — TELEPHONE (OUTPATIENT)
Dept: FAMILY MEDICINE | Facility: CLINIC | Age: 59
End: 2019-12-04

## 2019-12-04 NOTE — TELEPHONE ENCOUNTER
----- Message from Thuy Skaggs sent at 12/4/2019  4:24 PM CST -----  Contact: [facility]  [facility] calling needing to verify the pt dosage on his gabapentin (NEURONTIN)..Cause the 600 being sent there and 800 mg at another.      [facility] - [place] - [address] [address]  [address] [address]  [facility] [address]  Phone: [phone] Fax: [phone]

## 2019-12-04 NOTE — TELEPHONE ENCOUNTER
Confirmed with pharmacy, per pt's LOV note, pt should be getting 800mg TID. She expressed understanding and cancelled 600mg Rx

## 2019-12-07 RX ORDER — GABAPENTIN 600 MG/1
TABLET ORAL
Qty: 90 TABLET | Refills: 0 | Status: SHIPPED | OUTPATIENT
Start: 2019-12-07 | End: 2020-01-06

## 2019-12-10 ENCOUNTER — TELEPHONE (OUTPATIENT)
Dept: FAMILY MEDICINE | Facility: CLINIC | Age: 59
End: 2019-12-10

## 2019-12-10 NOTE — TELEPHONE ENCOUNTER
----- Message from Josey Gunderson sent at 12/10/2019  1:37 PM CST -----  Type:  Pharmacy Calling to Clarify an RX    Name of Caller: Sherie  Pharmacy Name: [facility]  Prescription Name: albuterol-ipratropium (DUO-NEB) 2.5 mg-0.5 mg/3 mL nebulizer solution  What do they need to clarify?: refill authorization  Best Call Back Number: [phone]  Additional Information: n/a

## 2019-12-20 DIAGNOSIS — Z12.11 COLON CANCER SCREENING: ICD-10-CM

## 2020-01-06 RX ORDER — GABAPENTIN 600 MG/1
TABLET ORAL
Qty: 90 TABLET | Refills: 0 | Status: SHIPPED | OUTPATIENT
Start: 2020-01-06 | End: 2020-01-13 | Stop reason: SDUPTHER

## 2020-01-13 RX ORDER — ALPRAZOLAM 1 MG/1
TABLET ORAL
Qty: 180 TABLET | Refills: 0 | Status: SHIPPED | OUTPATIENT
Start: 2020-01-13 | End: 2020-02-07 | Stop reason: SDUPTHER

## 2020-01-13 RX ORDER — GABAPENTIN 600 MG/1
TABLET ORAL
Qty: 270 TABLET | Refills: 0 | OUTPATIENT
Start: 2020-01-13

## 2020-01-13 RX ORDER — GABAPENTIN 600 MG/1
600 TABLET ORAL 3 TIMES DAILY
Qty: 270 TABLET | Refills: 0 | Status: SHIPPED | OUTPATIENT
Start: 2020-01-13 | End: 2020-02-07 | Stop reason: SDUPTHER

## 2020-01-13 RX ORDER — ALPRAZOLAM 1 MG/1
TABLET ORAL
Qty: 60 TABLET | Refills: 0 | OUTPATIENT
Start: 2020-01-13

## 2020-01-13 NOTE — TELEPHONE ENCOUNTER
----- Message from Nunu Barry sent at 1/13/2020  3:22 PM CST -----  Type:  RX Refill Request    Who Called:  Patient   Refill or New Rx:  refill  RX Name and Strength: ALPRAZolam (XANAX) 1 MG tablet   How is the patient currently taking it? (ex. 1XDay):  1 x day  Is this a 30 day or 90 day RX:  90  Preferred Pharmacy with phone number:    [facility] [address] [address]  [address] [address], [address] [address]  Phone: [phone] Fax: [phone]  Local or Mail Order:  local  Ordering Provider:  Red Zavala Call Back Number:  [phone] (home)   Additional Information:

## 2020-01-14 ENCOUNTER — OFFICE VISIT (OUTPATIENT)
Dept: DERMATOLOGY | Facility: CLINIC | Age: 60
End: 2020-01-14
Payer: MEDICARE

## 2020-01-14 VITALS — BODY MASS INDEX: 29.68 KG/M2 | HEIGHT: 71 IN | WEIGHT: 212 LBS

## 2020-01-14 DIAGNOSIS — D48.5 NEOPLASM OF UNCERTAIN BEHAVIOR OF SKIN: Primary | ICD-10-CM

## 2020-01-14 DIAGNOSIS — L81.4 SOLAR LENTIGO: ICD-10-CM

## 2020-01-14 PROCEDURE — 3008F BODY MASS INDEX DOCD: CPT | Mod: CPTII,S$GLB,, | Performed by: DERMATOLOGY

## 2020-01-14 PROCEDURE — 99202 PR OFFICE/OUTPT VISIT, NEW, LEVL II, 15-29 MIN: ICD-10-PCS | Mod: S$GLB,,, | Performed by: DERMATOLOGY

## 2020-01-14 PROCEDURE — 99999 PR PBB SHADOW E&M-EST. PATIENT-LVL III: ICD-10-PCS | Mod: PBBFAC,,, | Performed by: DERMATOLOGY

## 2020-01-14 PROCEDURE — 3008F PR BODY MASS INDEX (BMI) DOCUMENTED: ICD-10-PCS | Mod: CPTII,S$GLB,, | Performed by: DERMATOLOGY

## 2020-01-14 PROCEDURE — 99999 PR PBB SHADOW E&M-EST. PATIENT-LVL III: CPT | Mod: PBBFAC,,, | Performed by: DERMATOLOGY

## 2020-01-14 PROCEDURE — 99202 OFFICE O/P NEW SF 15 MIN: CPT | Mod: S$GLB,,, | Performed by: DERMATOLOGY

## 2020-01-14 RX ORDER — OXYCODONE HYDROCHLORIDE 10 MG/1
TABLET ORAL
COMMUNITY
Start: 2019-11-27 | End: 2020-01-14 | Stop reason: SDUPTHER
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
## 2020-01-20 ENCOUNTER — TELEPHONE (OUTPATIENT)
Dept: OPHTHALMOLOGY | Facility: CLINIC | Age: 60
End: 2020-01-20

## 2020-01-20 NOTE — TELEPHONE ENCOUNTER
----- Message from Roxanne Mesa sent at 1/20/2020  8:14 AM CST -----  Contact: Patient  Type: Needs Medical Advice    Who Called:  Patient  Best Call Back Number:   Additional Information: Calling to make a new patient appointment from referral. Patient advised that if he does not answer then to leave a message.

## 2020-01-22 ENCOUNTER — TELEPHONE (OUTPATIENT)
Dept: OPHTHALMOLOGY | Facility: CLINIC | Age: 60
End: 2020-01-22

## 2020-01-22 NOTE — TELEPHONE ENCOUNTER
----- Message from Vira Conde sent at 1/22/2020 10:05 AM CST -----  Contact: self  Pt is calling to det up an appt for his eye lid and would like for someone to give him a call back [phone]

## 2020-02-07 NOTE — TELEPHONE ENCOUNTER
----- Message from Toyin Abreu sent at 2/7/2020 10:09 AM CST -----  Contact: pt [phone]  Patient called and asked if you will call all of his medications to [facility] the patient is having problem getting his medications. He is asking if you will cancel the order for the Alprazolam and resend into  [facility] pt is asking if you will take all the other pharmacies out of his chart.

## 2020-02-08 RX ORDER — FLUTICASONE PROPIONATE AND SALMETEROL XINAFOATE 45; 21 UG/1; UG/1
2 AEROSOL, METERED RESPIRATORY (INHALATION) 2 TIMES DAILY
Qty: 12 G | Refills: 3 | Status: SHIPPED | OUTPATIENT
Start: 2020-02-08 | End: 2020-05-15

## 2020-02-08 RX ORDER — TIZANIDINE 4 MG/1
TABLET ORAL
Qty: 30 TABLET | Refills: 0 | Status: SHIPPED | OUTPATIENT
Start: 2020-02-08 | End: 2020-04-08

## 2020-02-08 RX ORDER — GABAPENTIN 600 MG/1
600 TABLET ORAL 3 TIMES DAILY
Qty: 270 TABLET | Refills: 0 | Status: SHIPPED | OUTPATIENT
Start: 2020-02-08 | End: 2020-03-05

## 2020-02-08 RX ORDER — ALPRAZOLAM 1 MG/1
TABLET ORAL
Qty: 180 TABLET | Refills: 0 | Status: SHIPPED | OUTPATIENT
Start: 2020-02-08 | End: 2020-04-08 | Stop reason: SDUPTHER

## 2020-02-10 NOTE — TELEPHONE ENCOUNTER
Called pt in regards to rx refill and scheduling a follow up appt. No answer. LVM to return call to clinic to schedule.

## 2020-03-03 ENCOUNTER — TELEPHONE (OUTPATIENT)
Dept: FAMILY MEDICINE | Facility: CLINIC | Age: 60
End: 2020-03-03

## 2020-03-03 NOTE — TELEPHONE ENCOUNTER
Rec'd fax from [facility]. It appears gabapentin 600mg was sent in, however, last note indicates to increase to 800mg. This was not changed in Epic. Please review last note and advise. Please route back. Will call pt to schedule f/u appt that is due.

## 2020-03-05 RX ORDER — GABAPENTIN 800 MG/1
800 TABLET ORAL 3 TIMES DAILY
Qty: 90 TABLET | Refills: 0 | Status: SHIPPED | OUTPATIENT
Start: 2020-03-05 | End: 2020-04-08 | Stop reason: SDUPTHER

## 2020-03-31 ENCOUNTER — TELEPHONE (OUTPATIENT)
Dept: FAMILY MEDICINE | Facility: CLINIC | Age: 60
End: 2020-03-31

## 2020-03-31 NOTE — TELEPHONE ENCOUNTER
----- Message from Abdirashid Rivera sent at 3/31/2020 11:11 AM CDT -----  Contact: Ptnt  [phone]  Type: Needs Medical Advice    Who Called: Ptnt  [phone]    Additional Information: Advised returning a call to [facility] about a VV appmnt. Please advise.

## 2020-04-07 ENCOUNTER — PATIENT OUTREACH (OUTPATIENT)
Dept: ADMINISTRATIVE | Facility: HOSPITAL | Age: 60
End: 2020-04-07

## 2020-04-08 ENCOUNTER — OFFICE VISIT (OUTPATIENT)
Dept: FAMILY MEDICINE | Facility: CLINIC | Age: 60
End: 2020-04-08
Payer: MEDICARE

## 2020-04-08 DIAGNOSIS — F41.9 ANXIETY: ICD-10-CM

## 2020-04-08 DIAGNOSIS — J41.0 SIMPLE CHRONIC BRONCHITIS: ICD-10-CM

## 2020-04-08 DIAGNOSIS — F11.24 OPIOID DEPENDENCE WITH OPIOID-INDUCED MOOD DISORDER: ICD-10-CM

## 2020-04-08 DIAGNOSIS — M79.2 NEURALGIA: Primary | ICD-10-CM

## 2020-04-08 PROCEDURE — 99214 OFFICE O/P EST MOD 30 MIN: CPT | Mod: 95,,, | Performed by: FAMILY MEDICINE

## 2020-04-08 PROCEDURE — 99214 PR OFFICE/OUTPT VISIT, EST, LEVL IV, 30-39 MIN: ICD-10-PCS | Mod: 95,,, | Performed by: FAMILY MEDICINE

## 2020-04-08 RX ORDER — ALPRAZOLAM 1 MG/1
TABLET ORAL
Qty: 180 TABLET | Refills: 0 | Status: SHIPPED | OUTPATIENT
Start: 2020-05-07 | End: 2020-05-15 | Stop reason: SDUPTHER

## 2020-04-08 RX ORDER — GABAPENTIN 800 MG/1
800 TABLET ORAL 3 TIMES DAILY
Qty: 90 TABLET | Refills: 5 | Status: SHIPPED | OUTPATIENT
Start: 2020-04-08 | End: 2020-10-01

## 2020-04-08 NOTE — PROGRESS NOTES
Subjective:       Patient ID: Cholo Nogueira is a 59 y.o. male.    Chief Complaint: Followup chronic pain and anxiety  The patient location is: Home  The chief complaint leading to consultation is: Anxiety  Visit type: Virtual visit with synchronous audio as he was unable to connect on video  Total time spent with patient: 11 min  Each patient to whom he or she provides medical services by telemedicine is:  (1) informed of the relationship between the physician and patient and the respective role of any other health care provider with respect to management of the patient; and (2) notified that he or she may decline to receive medical services by telemedicine and may withdraw from such care at any time.    Notes: Hx of anxiety and takes xanax 1 mg bid. He is more anxious worrying about Covid. I reviewed recent notes from neurosurgery and dermatology. He is doing some home PT and [facility].  Scheduled for eyelid surgery after stay at home restrictions end. I reviewed and updated his med list. He gets Suboxone from Dr. Donohue in [place]. He had all teeth extracted.       Review of Systems   HENT:        2 days of nasal congestion and malaise but no fever.    Respiratory: Negative for shortness of breath.         Less breathing problems since smoking cessation 4 months ago. Uses nebulizer q1-2 days. Not needing Advair right now   Musculoskeletal: Positive for back pain and neck pain.   Neurological:        Hand numbness from CTS       Objective:      Physical Exam    Assessment:       1. Neuralgia    2. Simple chronic bronchitis    3. Anxiety    4. Opioid dependence with opioid-induced mood disorder        Plan:       I refilled xanax for May and gabapentin at [facility].

## 2020-04-30 ENCOUNTER — TELEPHONE (OUTPATIENT)
Dept: OPHTHALMOLOGY | Facility: CLINIC | Age: 60
End: 2020-04-30

## 2020-05-02 ENCOUNTER — NURSE TRIAGE (OUTPATIENT)
Dept: ADMINISTRATIVE | Facility: CLINIC | Age: 60
End: 2020-05-02

## 2020-05-02 NOTE — TELEPHONE ENCOUNTER
Reason for Disposition   Skin is split open or gaping  (or length > 1/2 inch or 12 mm)    Additional Information   Negative: [1] Major bleeding (e.g., spurting blood) AND [2] can't be stopped   Negative: Wound looks infected   Negative: Caused by animal bite   Negative: Caused by human bite   Negative: Amputated finger    Protocols used: FINGER INJURY-A-AH  Pt tearfully states he burnt and crushed finger a week ago now has an abscess. Pt tried to work on finger at home. Now red going down on hand, fever. Green and blood coming out. Doesnt want to see go to ER due to covid 19. Rec ED. Pt states he takes a medical van to see the doctor. Pt states he will try to get a ride to ED. rec 911if no transportation.

## 2020-05-05 PROBLEM — L98.499 ULCER OF FINGER: Status: ACTIVE | Noted: 2020-05-05

## 2020-05-05 PROBLEM — M86.9 FINGER OSTEOMYELITIS, RIGHT: Status: ACTIVE | Noted: 2020-05-05

## 2020-05-06 ENCOUNTER — TELEPHONE (OUTPATIENT)
Dept: FAMILY MEDICINE | Facility: CLINIC | Age: 60
End: 2020-05-06

## 2020-05-06 NOTE — TELEPHONE ENCOUNTER
Patient in hospital for amputation of finger and stating that he needs pain medication and will not give it to him due to the opiod risk and taking suboxone. Advised that PCP did not have rights in hospital and patient stated that he cannot be in this much pain.

## 2020-05-06 NOTE — TELEPHONE ENCOUNTER
I agree that #1 I am not his pain doctor and #2 I am unable to give orders for hospital patients.

## 2020-05-06 NOTE — TELEPHONE ENCOUNTER
----- Message from Roxanne Mesa sent at 5/6/2020 11:59 AM CDT -----  Contact: Patient  Type: Needs Medical Advice  Who Called:  Patient  Best Call Back Number:   Additional Information: Calling to speak with the nurse to find out if he can get a different medication for pain. He is currently in the hospital needing emergency surgery.

## 2020-05-11 ENCOUNTER — PATIENT OUTREACH (OUTPATIENT)
Dept: ADMINISTRATIVE | Facility: OTHER | Age: 60
End: 2020-05-11

## 2020-05-12 ENCOUNTER — TELEPHONE (OUTPATIENT)
Dept: ORTHOPEDICS | Facility: CLINIC | Age: 60
End: 2020-05-12

## 2020-05-12 ENCOUNTER — TELEPHONE (OUTPATIENT)
Dept: OPHTHALMOLOGY | Facility: CLINIC | Age: 60
End: 2020-05-12

## 2020-05-12 ENCOUNTER — OFFICE VISIT (OUTPATIENT)
Dept: OPHTHALMOLOGY | Facility: CLINIC | Age: 60
End: 2020-05-12
Payer: MEDICARE

## 2020-05-12 DIAGNOSIS — H02.9 LESION OF LEFT LOWER EYELID: Primary | ICD-10-CM

## 2020-05-12 PROCEDURE — 99442 PR PHYSICIAN TELEPHONE EVALUATION 11-20 MIN: CPT | Mod: 95,,, | Performed by: OPHTHALMOLOGY

## 2020-05-12 PROCEDURE — 99442 PR PHYSICIAN TELEPHONE EVALUATION 11-20 MIN: ICD-10-PCS | Mod: 95,,, | Performed by: OPHTHALMOLOGY

## 2020-05-12 NOTE — Clinical Note
Chai Conteh, Please contact this patient to set up eyelid lesion removal, [id number], within 2-3 weeks. I have a high suspicion for cancer. Thanks

## 2020-05-12 NOTE — TELEPHONE ENCOUNTER
----- Message from Tonya Nagy sent at 5/12/2020 11:09 AM CDT -----  Contact: self   Patient miss call from your office please call back at [phone] (home) [phone] (work)    Case number [id number]

## 2020-05-12 NOTE — TELEPHONE ENCOUNTER
----- Message from Soniya Shane MA sent at 5/12/2020 10:43 AM CDT -----  Contact: troy   Wants to follow up   Wednesday   Call back

## 2020-05-12 NOTE — PROGRESS NOTES
Assessment /Plan     For exam results, see Encounter Report.    Lesion of left lower eyelid      Consult Start Time: 05/12/2020 12:28  Consult End Time: 05/12/2020 12:39        The patient location is: home  The chief complaint leading to consultation is: left lower eyelid lesion growing since birth, but growing over last two years with loss of lashes  Visit type: audio only  Total time spent with patient: 11 minutes   Each patient to whom he or she provides medical services by telemedicine is:  (1) informed of the relationship between the physician and patient and the respective role of any other health care provider with respect to management of the patient; and (2) notified that he or she may decline to receive medical services by telemedicine and may withdraw from such care at any time.    Notes:   The patient is a 59 y.o. Male with growing left lower eyelid lesion. No history of skin cancer. No family history of skin cancer.     Concern for squamous cell carcinoma. Plan for left lower eyelid biopsy in minor room asap.    Return for evaluation and biopsy.

## 2020-05-12 NOTE — TELEPHONE ENCOUNTER
----- Message from Lulú Sauer sent at 5/12/2020 10:23 AM CDT -----  Contact: Pt  Pt has a virtual visit @ 11:45am, but he is asking if it can just be a phone call visit. He was just released from the hospital and has endured 3 surgeries in 4 days that invovled his hand. So he is having trouble working apps and such on his phone.    Pts# [phone]

## 2020-05-13 ENCOUNTER — OFFICE VISIT (OUTPATIENT)
Dept: ORTHOPEDICS | Facility: CLINIC | Age: 60
End: 2020-05-13
Payer: MEDICARE

## 2020-05-13 VITALS
WEIGHT: 239.63 LBS | TEMPERATURE: 98 F | HEART RATE: 80 BPM | BODY MASS INDEX: 33.55 KG/M2 | HEIGHT: 71 IN | SYSTOLIC BLOOD PRESSURE: 124 MMHG | DIASTOLIC BLOOD PRESSURE: 73 MMHG

## 2020-05-13 DIAGNOSIS — M86.9 FINGER OSTEOMYELITIS, RIGHT: Primary | ICD-10-CM

## 2020-05-13 PROCEDURE — 99999 PR PBB SHADOW E&M-EST. PATIENT-LVL III: ICD-10-PCS | Mod: PBBFAC,,, | Performed by: ORTHOPAEDIC SURGERY

## 2020-05-13 PROCEDURE — 99024 POSTOP FOLLOW-UP VISIT: CPT | Mod: S$GLB,,, | Performed by: ORTHOPAEDIC SURGERY

## 2020-05-13 PROCEDURE — 99999 PR PBB SHADOW E&M-EST. PATIENT-LVL III: CPT | Mod: PBBFAC,,, | Performed by: ORTHOPAEDIC SURGERY

## 2020-05-13 PROCEDURE — 99024 PR POST-OP FOLLOW-UP VISIT: ICD-10-PCS | Mod: S$GLB,,, | Performed by: ORTHOPAEDIC SURGERY

## 2020-05-13 NOTE — PROGRESS NOTES
Mr Nogueira returns to clinic today.  Has a history of right index finger osteomyelitis.  He underwent a finger tip amputation.  States that he is overall doing well.  He still has some pain to the finger.  He states that he has obtained the Zyvox and is taking it daily.  He has no new complaints.    Physical exam:  Examination the right index finger reveals that the amputation site appears to be healing in well.  There is still mild to moderate edema about the finger.  There is no erythema.  There is no drainage.  The incision overlying the palm of the hand is now closed and healing over.  There is minimal erythema in the area.    Assessment:  Right index finger osteomyelitis    Plan:    1.  His wounds were cleaned today and a new dressing was placed    2.  He will continue to wear this dressing in be limited weight-bearing to the hand until he follows up in 1 week    3.  Will continue Zyvox for the full 3 week course    4.  He will follow up with me in 1 week for repeat wound check.

## 2020-05-18 ENCOUNTER — NURSE TRIAGE (OUTPATIENT)
Dept: ADMINISTRATIVE | Facility: CLINIC | Age: 60
End: 2020-05-18

## 2020-05-19 NOTE — TELEPHONE ENCOUNTER
Spoke with patient on behalf of the Post procedure symptom tracker and he denies developiing cough fever and or difficulty breathing since procedure.  Patient Calling PCP today to see if about dressing change.  If he has symptoms to call PCP OCC RN.       Reason for Disposition   Information only question and nurse able to answer    Additional Information   Negative: Nursing judgment   Negative: Nursing judgment   Negative: Nursing judgment   Negative: Nursing judgment    Protocols used: NO PROTOCOL AVAILABLE - INFORMATION ONLY-A-[place]

## 2020-05-20 ENCOUNTER — NURSE TRIAGE (OUTPATIENT)
Dept: ADMINISTRATIVE | Facility: CLINIC | Age: 60
End: 2020-05-20

## 2020-05-20 NOTE — TELEPHONE ENCOUNTER
Pt stated he missed a call. Please call pt at [phone]. Message sent to NAS Menezes.     Reason for Disposition   [1] Follow-up call to recent contact AND [2] information only call, no triage required    Protocols used: INFORMATION ONLY CALL-A-

## 2020-05-20 NOTE — TELEPHONE ENCOUNTER
"Patient phone call was conducted on 5/20/2020 at 10am. Patient reports new worsening shortness of breath and "passed out" two days ago while walking outside. Patient says he saw "bright lights" and momentarily lost consciousness. Patient reports feeling better today. Patient report SOB comes on with walking and moving around too much. Patient denies fever but reports feeling like he's "burning up." I advised the patient to take his temperature if possible. Patient reports no cough.     Patient was advised to go to the ED (following suspected COVID protocol). Patient states he will try to get to the ED but he is unsure of which he can get to due to not having a consistent ride. I advised the patient on which on ED's in his area.     Reason for Disposition   MILD difficulty breathing (e.g., minimal/no SOB at rest, SOB with walking, pulse <100)    Additional Information   Negative: SEVERE difficulty breathing (e.g., struggling for each breath, speaks in single words)   Negative: Difficult to awaken or acting confused (e.g., disoriented, slurred speech)   Negative: Bluish (or gray) lips or face now   Negative: Shock suspected (e.g., cold/pale/clammy skin, too weak to stand, low BP, rapid pulse)   Negative: Sounds like a life-threatening emergency to the triager   Negative: [1] COVID-19 suspected (e.g., cough, fever, shortness of breath) AND [2] public health department recommends testing   Negative: [1] COVID-19 exposure AND [2] no symptoms   Negative: COVID-19 and Breastfeeding, questions about   Negative: SEVERE or constant chest pain (Exception: mild central chest pain, present only when coughing)   Negative: MODERATE difficulty breathing (e.g., speaks in phrases, SOB even at rest, pulse 100-120)   Negative: [1] Fever > 101 F (38.3 C) AND [2] age > 60   Negative: Fever > 103 F (39.4 C)   Negative: HIGH RISK patient (e.g., age > 64 years, diabetes, heart or lung disease, weak immune system)   Negative: " [1] Fever > 100.0 F (37.8 C) AND [2] bedridden (e.g., nursing home patient, CVA, chronic illness, recovering from surgery)   Negative: [1] Fever returns after gone for over 24 hours AND [2] symptoms worse or not improved   Negative: Fever present > 3 days (72 hours)   Negative: [1] Continuous (nonstop) coughing interferes with work or school AND [2] no improvement using cough treatment per protocol   Negative: Cough present > 3 weeks   Negative: [1] COVID-19 infection diagnosed or suspected AND [2] mild symptoms (fever, cough) AND [3] no trouble breathing or other complications   Negative: COVID-19, questions about   Negative: Chest pain   Negative: Patient sounds very sick or weak to the triager    Protocols used: CORONAVIRUS (COVID-19) DIAGNOSED OR [id number]    SHERRY Richardson III

## 2020-05-20 NOTE — TELEPHONE ENCOUNTER
Patient c/o sweating, diarrhea, pt walking outside. Instructed pt to stay inside. Pt stated has someone coming to bring him to the ED, instructed pt to call 911 if unable to get there.    Reason for Disposition   Weakness   Diarrhea is main symptom   Patient sounds very sick or weak to the triager    Additional Information   Negative: Shock suspected (e.g., cold/pale/clammy skin, too weak to stand, low BP, rapid pulse)   Negative: Sounds like a life-threatening emergency to the triager   Negative: Fever   Negative: Chest pain or cardiac ischemia is suspected   Negative: Severe difficulty breathing (e.g., struggling for each breath, speaks in single words)   Negative: Shock suspected (e.g., cold/pale/clammy skin, too weak to stand, low BP, rapid pulse)   Negative: Difficult to awaken or acting confused (e.g., disoriented, slurred speech)   Negative: [1] Fainted > 15 minutes ago AND [2] still feels too weak or dizzy to stand   Negative: [1] SEVERE weakness (i.e., unable to walk or barely able to walk, requires support) AND     [2] new onset or worsening   Negative: Sounds like a life-threatening emergency to the triager   Negative: Weakness of the face, arm or leg on one side of the body   Negative: [1] Has diabetes (diabetes mellitus) AND [2] weakness from low blood sugar (i.e., < 60 mg/dl or 3.5 mmol/l)   Negative: Heat exhaustion suspected (i.e., dehydration from heat exposure)   Negative: Vomiting is main symptom   Negative: Shock suspected (e.g., cold/pale/clammy skin, too weak to stand, low BP, rapid pulse)   Negative: Difficult to awaken or acting confused (e.g., disoriented, slurred speech)   Negative: Sounds like a life-threatening emergency to the triager   Negative: Vomiting also present and worse than the diarrhea   Negative: [1] Blood in stool AND [2] without diarrhea   Negative: Diarrhea in a cancer patient who is currently (or recently) receiving chemotherapy or radiation therapy,  or cancer patient who has metastatic or end-stage cancer and is receiving palliative care   Negative: [1] SEVERE abdominal pain (e.g., excruciating) AND [2] present > 1 hour   Negative: [1] SEVERE abdominal pain AND [2] age > 60   Negative: [1] Blood in the stool AND [2] moderate or large amount of blood   Negative: Black or tarry bowel movements  (Exception: chronic-unchanged  black-grey bowel movements AND is taking iron pills or Pepto-bismol)   Negative: [1] Drinking very little AND [2] dehydration suspected (e.g., no urine > 12 hours, very dry mouth, very lightheaded)    Protocols used: GPPNAAKY-H-IB, WEAKNESS (GENERALIZED) AND FATIGUE-A-AH, DIARRHEA-A-AH

## 2020-05-21 ENCOUNTER — TELEPHONE (OUTPATIENT)
Dept: ORTHOPEDICS | Facility: CLINIC | Age: 60
End: 2020-05-21

## 2020-05-21 NOTE — TELEPHONE ENCOUNTER
----- Message from Christopher Kennedy sent at 5/21/2020  9:26 AM CDT -----  Contact: pt  Type:  Patient Returning Call    Who Called:  pt  Who Left Message for Patient:  Sammy  Does the patient know what this is regarding?:  apt  Best Call Back Number:  [phone]  Additional Information:  Pt can not get a ride and needs 3 day notice

## 2020-05-21 NOTE — TELEPHONE ENCOUNTER
Pt states at this time he is not able to secure transportation for his appointment with Dr. Herring scheduled for today.  Informed pt that I will call him later today after speaking with Dr. Herring for advice.

## 2020-05-21 NOTE — TELEPHONE ENCOUNTER
Pt called stating he is having trouble getting transportation coming in for his office visit with Dr. Herring for follow up on finger osteomyelitis.  I inquired if pt went to ED as instructed by On Call nurse yesterday.  Pt reports he did not seek medical attention yesterday but instead laid down and got rest inside his house.  Pt reports he is feeling much better today.  Pt denies weakness, fever, or diarrhea today.  Pt reports swelling in his finger but no new swelling or drainage into his bandaging.  Pt reports no new redness or red streaking line spreading to the palm of his hand.  Informed pt to contact me asap if he is unable to make today's appointment.  Pt verbalized understanding and agreed to contact me if he has any problems.

## 2020-05-21 NOTE — TELEPHONE ENCOUNTER
----- Message from Sandeep Madden sent at 5/21/2020  8:44 AM CDT -----  Contact: pt  -  [phone]  Patient access

## 2020-05-22 ENCOUNTER — TELEPHONE (OUTPATIENT)
Dept: ORTHOPEDICS | Facility: CLINIC | Age: 60
End: 2020-05-22

## 2020-05-22 NOTE — TELEPHONE ENCOUNTER
Pt rescheduled for post op for right index finger tip amputation with Dr. Herring on 5/27/20 at 11:20 AM.  Due to transportation issues I requested pt to arrive early for his appointment to ensure he is seen that morning by provider.  Pt agreed to arrive early for his appointment on 5/27/20.

## 2020-05-22 NOTE — TELEPHONE ENCOUNTER
Pt states his insurance provider is unable to get him transportation due to 5/25/20 is a holiday and that is not allowing for 3 days to provide for transportation for his visit with Dr. Herring on 5/27/20.  I informed pt that I am not sure how to overcome this issue with his insurance provider.  I requested pt to contact his insurance and inform them the visit scheduled for 5/27 is medically urgent as he is in a post operative state.  I requested pt to call me back with any information that his insurance would need from this office to secure transportation if he is still having problems getting transportation to this office on 5/27/20.

## 2020-05-22 NOTE — TELEPHONE ENCOUNTER
----- Message from Sandeep Madden sent at 5/22/2020 10:52 AM CDT -----  Contact: pt - [phone]  Please call again,,  Can't come on Monday, Holiday

## 2020-05-25 ENCOUNTER — TELEPHONE (OUTPATIENT)
Dept: ORTHOPEDICS | Facility: CLINIC | Age: 60
End: 2020-05-25

## 2020-05-25 NOTE — TELEPHONE ENCOUNTER
Spoke to patient. Moved appt to 8am on 5/27 as requested by Dr. Herring. Patient stated understanding.

## 2020-05-27 ENCOUNTER — OFFICE VISIT (OUTPATIENT)
Dept: ORTHOPEDICS | Facility: CLINIC | Age: 60
End: 2020-05-27
Payer: MEDICARE

## 2020-05-27 ENCOUNTER — HOSPITAL ENCOUNTER (OUTPATIENT)
Dept: RADIOLOGY | Facility: HOSPITAL | Age: 60
Discharge: HOME | End: 2020-05-27
Attending: ORTHOPAEDIC SURGERY
Payer: MEDICARE

## 2020-05-27 ENCOUNTER — TELEPHONE (OUTPATIENT)
Dept: OPHTHALMOLOGY | Facility: CLINIC | Age: 60
End: 2020-05-27

## 2020-05-27 VITALS — WEIGHT: 239 LBS | TEMPERATURE: 98 F | BODY MASS INDEX: 33.46 KG/M2 | HEIGHT: 71 IN

## 2020-05-27 DIAGNOSIS — M86.9 FINGER OSTEOMYELITIS, RIGHT: ICD-10-CM

## 2020-05-27 DIAGNOSIS — Z13.9 SCREENING FOR UNSPECIFIED CONDITION: Primary | ICD-10-CM

## 2020-05-27 DIAGNOSIS — M86.9 FINGER OSTEOMYELITIS, RIGHT: Primary | ICD-10-CM

## 2020-05-27 PROCEDURE — 73140 X-RAY EXAM OF FINGER(S): CPT | Mod: 26,RT,, | Performed by: RADIOLOGY

## 2020-05-27 PROCEDURE — 73140 X-RAY EXAM OF FINGER(S): CPT | Mod: TC,PO,RT

## 2020-05-27 PROCEDURE — 73140 XR FINGER 2 OR MORE VIEWS: ICD-10-PCS | Mod: 26,RT,, | Performed by: RADIOLOGY

## 2020-05-27 PROCEDURE — 99999 PR PBB SHADOW E&M-EST. PATIENT-LVL III: CPT | Mod: PBBFAC,,, | Performed by: ORTHOPAEDIC SURGERY

## 2020-05-27 PROCEDURE — 99999 PR PBB SHADOW E&M-EST. PATIENT-LVL III: ICD-10-PCS | Mod: PBBFAC,,, | Performed by: ORTHOPAEDIC SURGERY

## 2020-05-27 PROCEDURE — 99024 POSTOP FOLLOW-UP VISIT: CPT | Mod: S$GLB,,, | Performed by: ORTHOPAEDIC SURGERY

## 2020-05-27 PROCEDURE — 99024 PR POST-OP FOLLOW-UP VISIT: ICD-10-PCS | Mod: S$GLB,,, | Performed by: ORTHOPAEDIC SURGERY

## 2020-05-27 NOTE — PROGRESS NOTES
Mr Nogueira returns to clinic today.  Has a history of right index finger osteomyelitis with amputation of the tip.  He states that he is overall doing better but he still has some areas of redness over the tip of the finger.  He continues to take his antibiotic but it does cause some nausea.    Physical exam:  Examination the right index finger reveals that the incision site at the tip is healing well.  There is very mild erythema.  There is minimal edema.  There is a very small pustule overlying the very tip of the finger.  This pustule was unroofed and a very small amount of purulence was expressed.  This appeared to be superficial.  There is no tenderness over the flexor tendon sheath.  The proximal wound is now well healed.  He has capillary refill less than 2 sec at the tip of the finger    Radiology:  X-rays of the right index finger taken today.  He is noted to have an amputation through the middle phalanx.  The remaining middle phalanx does not appear to have signs of an osteomyelitis.    Assessment:  Right index finger osteomyelitis status post amputation of the tip    Plan:    1.  His wounds were cleaned today and a new dressing was placed    2.  Continue daily dressing changes    3.  He will continue his antibiotics until his course is completed    4.  Will follow up with me in 10 days for a wound check.

## 2020-05-28 ENCOUNTER — TELEPHONE (OUTPATIENT)
Dept: FAMILY MEDICINE | Facility: CLINIC | Age: 60
End: 2020-05-28

## 2020-05-28 ENCOUNTER — TELEPHONE (OUTPATIENT)
Dept: OPHTHALMOLOGY | Facility: CLINIC | Age: 60
End: 2020-05-28

## 2020-05-28 NOTE — TELEPHONE ENCOUNTER
Attempted to contact pt on both numbers listed. No answer. Left voicemail for pt to call clinic.-KM

## 2020-05-28 NOTE — TELEPHONE ENCOUNTER
----- Message from Katie Ta sent at 5/28/2020  9:10 AM CDT -----  Type: Needs Medical Advice  Who Called:  Patient  Best Call Back Number: [phone] (home) [phone] (work)    Additional Information: States that he cannot get transportation to the Covid pre test on 5/30/20. He does not have transportation. I offered other sites and all he could say was he could not get transportation. Please call to advise.

## 2020-05-28 NOTE — TELEPHONE ENCOUNTER
----- Message from Raven Rowe sent at 5/28/2020  9:24 AM CDT -----  Contact: Cholo      ----- Message -----  From: Siria Ragland  Sent: 5/28/2020   9:19 AM CDT  To: Marisa [facility] Staff    Pt needs a return phone call in regards to COVID-19 test before surgery. He states there is a problem with the testing site. Pt can be reached at [phone].

## 2020-05-29 NOTE — TELEPHONE ENCOUNTER
----- Message from Nick Escalona sent at 5/29/2020  8:19 AM CDT -----  Contact: pt  Type:  Patient Returning Call    Who Called:  pt  Does the patient know what this is regarding?:  Pt needs to schedule to [facility] for COVID testing today if possible. He is scheduled to [facility] and does not have transportation and needs COVID testing for procedure Monday. Please    Best Call Back Number:  [phone]  Additional Information:  Thank you

## 2020-05-29 NOTE — TELEPHONE ENCOUNTER
Reviewed COVID testing site with pt, he expressed understanding. He will r/s due to transportation

## 2020-06-05 ENCOUNTER — TELEPHONE (OUTPATIENT)
Dept: ORTHOPEDICS | Facility: CLINIC | Age: 60
End: 2020-06-05

## 2020-06-05 NOTE — TELEPHONE ENCOUNTER
----- Message from Mckinley Armas sent at 6/5/2020  1:40 PM CDT -----  Contact: Pt  Pt called and would like to speak to someone regarding his appt on Monday    Pt can be reached at [phone]

## 2020-06-05 NOTE — TELEPHONE ENCOUNTER
----- Message from Toyin Abreu sent at 6/5/2020  3:50 PM CDT -----  Contact: pt [phone]  Call placed to pod. Patient is calling back he missed a call from the office.  Patient is asking for 3 days ahead to schedule he has to rely on Public transportation.  Please call back [phone]

## 2020-06-08 ENCOUNTER — TELEPHONE (OUTPATIENT)
Dept: OPHTHALMOLOGY | Facility: CLINIC | Age: 60
End: 2020-06-08

## 2020-06-08 NOTE — TELEPHONE ENCOUNTER
----- Message from Citlaly Adams sent at 6/8/2020 10:05 AM CDT -----  Contact: Pt      ----- Message -----  From: Lulú Sauer  Sent: 6/8/2020   9:52 AM CDT  To: Felisa Rainey Staff    Pt is supposed to schedule a procedure with the doctor and is wanting to talk to someone about it. He said he also had a lot of problems when recently trying to get his Covid testing done. He couldn't make it today because his ride cancelled.    Pts#[phone]

## 2020-06-11 NOTE — TELEPHONE ENCOUNTER
"Pt unable to schedule appointment for 6/15/20 due to transportation issues.  Pt stated he was unable to call me back due to his phone is having problems.  Pt scheduled for 6/17/20 at 11:20 AM for follow up right index finger surgery.  Pt states his stitches have come out his finger, pt reports he has hit his finger a couple times.  Pt states "It's not infected."  Informed pt that I will speak to Dr. Herring regarding this call then call pt back with advice.  "

## 2020-06-11 NOTE — TELEPHONE ENCOUNTER
Informed pt I notified Dr. Herring of this phone conversation and that pt is to come in on 6/15/20 for his scheduled appointment.  Advised pt to attempt to prevent injuring his finger at this time and to go to the emergency room if he suspects any infection occurring.  Informed pt to not wait for his appointment with Dr. Herring if it looks like his finger is becoming infected.  Pt successfully taught back to me when he should go to the emergency room and to not wait for his next appointment.  Pt had no questions to address at this time.

## 2020-06-17 ENCOUNTER — OFFICE VISIT (OUTPATIENT)
Dept: ORTHOPEDICS | Facility: CLINIC | Age: 60
End: 2020-06-17
Payer: MEDICARE

## 2020-06-17 VITALS
HEART RATE: 53 BPM | BODY MASS INDEX: 33.46 KG/M2 | HEIGHT: 71 IN | WEIGHT: 239 LBS | DIASTOLIC BLOOD PRESSURE: 96 MMHG | SYSTOLIC BLOOD PRESSURE: 158 MMHG | TEMPERATURE: 99 F

## 2020-06-17 DIAGNOSIS — S68.110A AMPUTATION OF RIGHT INDEX FINGER: ICD-10-CM

## 2020-06-17 DIAGNOSIS — M86.9 FINGER OSTEOMYELITIS, RIGHT: Primary | ICD-10-CM

## 2020-06-17 PROCEDURE — 99024 PR POST-OP FOLLOW-UP VISIT: ICD-10-PCS | Mod: S$GLB,,, | Performed by: ORTHOPAEDIC SURGERY

## 2020-06-17 PROCEDURE — 99999 PR PBB SHADOW E&M-EST. PATIENT-LVL IV: CPT | Mod: PBBFAC,,, | Performed by: ORTHOPAEDIC SURGERY

## 2020-06-17 PROCEDURE — 99999 PR PBB SHADOW E&M-EST. PATIENT-LVL IV: ICD-10-PCS | Mod: PBBFAC,,, | Performed by: ORTHOPAEDIC SURGERY

## 2020-06-17 PROCEDURE — 99024 POSTOP FOLLOW-UP VISIT: CPT | Mod: S$GLB,,, | Performed by: ORTHOPAEDIC SURGERY

## 2020-06-17 RX ORDER — LINEZOLID 600 MG/1
600 TABLET, FILM COATED ORAL EVERY 12 HOURS
Qty: 28 TABLET | Refills: 0 | Status: SHIPPED | OUTPATIENT
Start: 2020-06-17 | End: 2020-07-01

## 2020-06-18 NOTE — PROGRESS NOTES
Mr Nogueira returns to clinic today.  Has a history of right index finger osteomyelitis which underwent an amputation.  He states that overall he is doing well.  States that he has had decreased swelling and pain but has had that finger on 1 or 2 occasions.    Physical exam:  Examination the right index finger reveals that the amputation is healing well.  There is a small wound over the dorsum of the finger from a new small puncture.  There is no significant erythema or significant edema.  Palpation does produce mild hypersensitivity at the tip.    Assessment:  Status post right index finger amputation for osteomyelitis    Plan:    1.  I will give him an additional 14 days of Zyvox as he does have a new wound overlying the tip of the fingers    2.  He will continue to cover the wound for activity    3.  Will follow up with me in 4 weeks for repeat evaluation

## 2020-08-03 RX ORDER — ALPRAZOLAM 1 MG/1
TABLET ORAL
Qty: 180 TABLET | Refills: 0 | Status: SHIPPED | OUTPATIENT
Start: 2020-08-03 | End: 2020-10-31

## 2020-10-01 RX ORDER — GABAPENTIN 800 MG/1
TABLET ORAL
Qty: 90 TABLET | Refills: 0 | Status: SHIPPED | OUTPATIENT
Start: 2020-10-01 | End: 2020-10-31

## 2020-10-15 ENCOUNTER — PES CALL (OUTPATIENT)
Dept: ADMINISTRATIVE | Facility: CLINIC | Age: 60
End: 2020-10-15

## 2020-10-31 RX ORDER — GABAPENTIN 800 MG/1
TABLET ORAL
Qty: 90 TABLET | Refills: 0 | Status: SHIPPED | OUTPATIENT
Start: 2020-10-31 | End: 2020-12-07

## 2020-10-31 RX ORDER — ALPRAZOLAM 1 MG/1
TABLET ORAL
Qty: 180 TABLET | Refills: 0 | Status: SHIPPED | OUTPATIENT
Start: 2020-10-31 | End: 2020-12-31

## 2020-11-02 ENCOUNTER — PATIENT MESSAGE (OUTPATIENT)
Dept: FAMILY MEDICINE | Facility: CLINIC | Age: 60
End: 2020-11-02

## 2020-12-07 RX ORDER — GABAPENTIN 800 MG/1
TABLET ORAL
Qty: 90 TABLET | Refills: 0 | Status: SHIPPED | OUTPATIENT
Start: 2020-12-07 | End: 2020-12-31

## 2020-12-08 NOTE — TELEPHONE ENCOUNTER
Attempted to contact patient to inform rx request has been approved, also patient needs to schedule a follow up appointment to receive future refills. No answer, lvm to return call to clinic.

## 2020-12-09 NOTE — TELEPHONE ENCOUNTER
Patient notified refill request has been approved, patient also scheduled video visit for f/u and refills.

## 2020-12-24 ENCOUNTER — OFFICE VISIT (OUTPATIENT)
Dept: FAMILY MEDICINE | Facility: CLINIC | Age: 60
End: 2020-12-24
Payer: MEDICARE

## 2020-12-24 DIAGNOSIS — S68.110A AMPUTATION OF RIGHT INDEX FINGER: ICD-10-CM

## 2020-12-24 DIAGNOSIS — M51.36 DDD (DEGENERATIVE DISC DISEASE), LUMBAR: ICD-10-CM

## 2020-12-24 DIAGNOSIS — F41.9 ANXIETY: Primary | ICD-10-CM

## 2020-12-24 PROCEDURE — 99213 OFFICE O/P EST LOW 20 MIN: CPT | Mod: 95,,, | Performed by: FAMILY MEDICINE

## 2020-12-24 PROCEDURE — 99213 PR OFFICE/OUTPT VISIT, EST, LEVL III, 20-29 MIN: ICD-10-PCS | Mod: 95,,, | Performed by: FAMILY MEDICINE

## 2020-12-24 NOTE — PROGRESS NOTES
Subjective:       Patient ID: Cholo Nogueira is a 60 y.o. male.    Chief Complaint:  Follow-up controlled medication  He takes the Xanax twice a day.  It helps control his chronic anxiety.  He also sees a pain physician for chronic low back pain and takes Suboxone.  He is breathing fairly well.  He had a crush injury to his right index finger which got infected and he had to have a distal digit amputation.  He has healed fairly well.  He thinks he has some carpal tunnel symptoms.    Review of Systems   Constitutional: Negative for fever and unexpected weight change.   Respiratory: Negative for shortness of breath.    Musculoskeletal: Positive for back pain.         Objective:      Physical Exam  Pulmonary:      Effort: No respiratory distress.         Assessment:       1. Anxiety    2. DDD (degenerative disc disease), lumbar    3. Amputation of right index finger        Plan:       I will refill his Xanax and gabapentin.  When the prescriptions are due.

## 2020-12-30 DIAGNOSIS — Z12.11 COLON CANCER SCREENING: ICD-10-CM

## 2020-12-31 RX ORDER — ALPRAZOLAM 1 MG/1
TABLET ORAL
Qty: 180 TABLET | Refills: 0 | Status: SHIPPED | OUTPATIENT
Start: 2021-01-31 | End: 2021-04-26 | Stop reason: SDUPTHER

## 2020-12-31 RX ORDER — GABAPENTIN 800 MG/1
TABLET ORAL
Qty: 90 TABLET | Refills: 0 | Status: SHIPPED | OUTPATIENT
Start: 2020-12-31 | End: 2021-02-04

## 2021-01-04 ENCOUNTER — TELEPHONE (OUTPATIENT)
Dept: FAMILY MEDICINE | Facility: CLINIC | Age: 61
End: 2021-01-04

## 2021-01-12 ENCOUNTER — PATIENT OUTREACH (OUTPATIENT)
Dept: ADMINISTRATIVE | Facility: HOSPITAL | Age: 61
End: 2021-01-12

## 2021-01-26 ENCOUNTER — PATIENT OUTREACH (OUTPATIENT)
Dept: ADMINISTRATIVE | Facility: HOSPITAL | Age: 61
End: 2021-01-26

## 2021-03-02 ENCOUNTER — OFFICE VISIT (OUTPATIENT)
Dept: FAMILY MEDICINE | Facility: CLINIC | Age: 61
End: 2021-03-02
Payer: MEDICARE

## 2021-03-02 VITALS
HEIGHT: 71 IN | BODY MASS INDEX: 34.57 KG/M2 | DIASTOLIC BLOOD PRESSURE: 80 MMHG | WEIGHT: 246.94 LBS | HEART RATE: 70 BPM | SYSTOLIC BLOOD PRESSURE: 122 MMHG

## 2021-03-02 DIAGNOSIS — R79.9 ABNORMAL FINDING OF BLOOD CHEMISTRY, UNSPECIFIED: ICD-10-CM

## 2021-03-02 DIAGNOSIS — F41.9 ANXIETY: ICD-10-CM

## 2021-03-02 DIAGNOSIS — J41.0 SIMPLE CHRONIC BRONCHITIS: ICD-10-CM

## 2021-03-02 DIAGNOSIS — Z00.00 HEALTH MAINTENANCE EXAMINATION: Primary | ICD-10-CM

## 2021-03-02 DIAGNOSIS — Z12.5 SCREENING FOR PROSTATE CANCER: ICD-10-CM

## 2021-03-02 DIAGNOSIS — Z72.0 TOBACCO ABUSE: ICD-10-CM

## 2021-03-02 PROCEDURE — 1125F PR PAIN SEVERITY QUANTIFIED, PAIN PRESENT: ICD-10-PCS | Mod: S$GLB,,, | Performed by: FAMILY MEDICINE

## 2021-03-02 PROCEDURE — 3079F DIAST BP 80-89 MM HG: CPT | Mod: CPTII,S$GLB,, | Performed by: FAMILY MEDICINE

## 2021-03-02 PROCEDURE — 3074F SYST BP LT 130 MM HG: CPT | Mod: CPTII,S$GLB,, | Performed by: FAMILY MEDICINE

## 2021-03-02 PROCEDURE — 90686 IIV4 VACC NO PRSV 0.5 ML IM: CPT | Mod: S$GLB,,, | Performed by: FAMILY MEDICINE

## 2021-03-02 PROCEDURE — G0008 FLU VACCINE (QUAD) GREATER THAN OR EQUAL TO 3YO PRESERVATIVE FREE IM: ICD-10-PCS | Mod: S$GLB,,, | Performed by: FAMILY MEDICINE

## 2021-03-02 PROCEDURE — 3079F PR MOST RECENT DIASTOLIC BLOOD PRESSURE 80-89 MM HG: ICD-10-PCS | Mod: CPTII,S$GLB,, | Performed by: FAMILY MEDICINE

## 2021-03-02 PROCEDURE — 99499 UNLISTED E&M SERVICE: CPT | Mod: S$GLB,,, | Performed by: FAMILY MEDICINE

## 2021-03-02 PROCEDURE — 1125F AMNT PAIN NOTED PAIN PRSNT: CPT | Mod: S$GLB,,, | Performed by: FAMILY MEDICINE

## 2021-03-02 PROCEDURE — 3008F PR BODY MASS INDEX (BMI) DOCUMENTED: ICD-10-PCS | Mod: CPTII,S$GLB,, | Performed by: FAMILY MEDICINE

## 2021-03-02 PROCEDURE — G0008 ADMIN INFLUENZA VIRUS VAC: HCPCS | Mod: S$GLB,,, | Performed by: FAMILY MEDICINE

## 2021-03-02 PROCEDURE — 99214 OFFICE O/P EST MOD 30 MIN: CPT | Mod: 25,S$GLB,, | Performed by: FAMILY MEDICINE

## 2021-03-02 PROCEDURE — 90686 FLU VACCINE (QUAD) GREATER THAN OR EQUAL TO 3YO PRESERVATIVE FREE IM: ICD-10-PCS | Mod: S$GLB,,, | Performed by: FAMILY MEDICINE

## 2021-03-02 PROCEDURE — 3008F BODY MASS INDEX DOCD: CPT | Mod: CPTII,S$GLB,, | Performed by: FAMILY MEDICINE

## 2021-03-02 PROCEDURE — 3074F PR MOST RECENT SYSTOLIC BLOOD PRESSURE < 130 MM HG: ICD-10-PCS | Mod: CPTII,S$GLB,, | Performed by: FAMILY MEDICINE

## 2021-03-02 PROCEDURE — 99499 RISK ADDL DX/OHS AUDIT: ICD-10-PCS | Mod: S$GLB,,, | Performed by: FAMILY MEDICINE

## 2021-03-02 PROCEDURE — 99214 PR OFFICE/OUTPT VISIT, EST, LEVL IV, 30-39 MIN: ICD-10-PCS | Mod: 25,S$GLB,, | Performed by: FAMILY MEDICINE

## 2021-03-09 RX ORDER — GABAPENTIN 800 MG/1
800 TABLET ORAL 3 TIMES DAILY
Qty: 90 TABLET | Refills: 1 | Status: SHIPPED | OUTPATIENT
Start: 2021-03-09 | End: 2021-05-03

## 2021-04-05 ENCOUNTER — PATIENT MESSAGE (OUTPATIENT)
Dept: ADMINISTRATIVE | Facility: HOSPITAL | Age: 61
End: 2021-04-05

## 2021-04-06 ENCOUNTER — PATIENT MESSAGE (OUTPATIENT)
Dept: ADMINISTRATIVE | Facility: HOSPITAL | Age: 61
End: 2021-04-06

## 2021-04-26 RX ORDER — ALPRAZOLAM 1 MG/1
1 TABLET ORAL 2 TIMES DAILY
Qty: 180 TABLET | Refills: 0 | Status: SHIPPED | OUTPATIENT
Start: 2021-04-26 | End: 2021-07-22

## 2021-05-13 ENCOUNTER — IMMUNIZATION (OUTPATIENT)
Dept: FAMILY MEDICINE | Facility: CLINIC | Age: 61
End: 2021-05-13
Payer: MEDICARE

## 2021-05-13 DIAGNOSIS — Z23 NEED FOR VACCINATION: Primary | ICD-10-CM

## 2021-05-13 PROCEDURE — 91300 COVID-19, MRNA, LNP-S, PF, 30 MCG/0.3 ML DOSE VACCINE: CPT | Mod: PBBFAC | Performed by: FAMILY MEDICINE

## 2021-06-03 ENCOUNTER — LAB VISIT (OUTPATIENT)
Dept: LAB | Facility: HOSPITAL | Age: 61
End: 2021-06-03
Attending: FAMILY MEDICINE
Payer: MEDICARE

## 2021-06-03 ENCOUNTER — IMMUNIZATION (OUTPATIENT)
Dept: FAMILY MEDICINE | Facility: CLINIC | Age: 61
End: 2021-06-03
Payer: MEDICARE

## 2021-06-03 DIAGNOSIS — R79.9 ABNORMAL FINDING OF BLOOD CHEMISTRY, UNSPECIFIED: ICD-10-CM

## 2021-06-03 DIAGNOSIS — Z00.00 HEALTH MAINTENANCE EXAMINATION: ICD-10-CM

## 2021-06-03 DIAGNOSIS — Z12.5 SCREENING FOR PROSTATE CANCER: ICD-10-CM

## 2021-06-03 DIAGNOSIS — Z23 NEED FOR VACCINATION: Primary | ICD-10-CM

## 2021-06-03 LAB
ALBUMIN SERPL BCP-MCNC: 3.4 G/DL (ref 3.5–5.2)
ALP SERPL-CCNC: 86 U/L (ref 55–135)
ALT SERPL W/O P-5'-P-CCNC: 71 U/L (ref 10–44)
ANION GAP SERPL CALC-SCNC: 8 MMOL/L (ref 8–16)
AST SERPL-CCNC: 49 U/L (ref 10–40)
BILIRUB SERPL-MCNC: 0.5 MG/DL (ref 0.1–1)
BUN SERPL-MCNC: 10 MG/DL (ref 6–20)
CALCIUM SERPL-MCNC: 8.7 MG/DL (ref 8.7–10.5)
CHLORIDE SERPL-SCNC: 108 MMOL/L (ref 95–110)
CHOLEST SERPL-MCNC: 134 MG/DL (ref 120–199)
CHOLEST/HDLC SERPL: 4.6 {RATIO} (ref 2–5)
CO2 SERPL-SCNC: 26 MMOL/L (ref 23–29)
COMPLEXED PSA SERPL-MCNC: 0.04 NG/ML (ref 0–4)
CREAT SERPL-MCNC: 0.9 MG/DL (ref 0.5–1.4)
EST. GFR  (AFRICAN AMERICAN): >60 ML/MIN/1.73 M^2
EST. GFR  (NON AFRICAN AMERICAN): >60 ML/MIN/1.73 M^2
GLUCOSE SERPL-MCNC: 127 MG/DL (ref 70–110)
HDLC SERPL-MCNC: 29 MG/DL (ref 40–75)
HDLC SERPL: 21.6 % (ref 20–50)
LDLC SERPL CALC-MCNC: 87.4 MG/DL (ref 63–159)
NONHDLC SERPL-MCNC: 105 MG/DL
POTASSIUM SERPL-SCNC: 4.1 MMOL/L (ref 3.5–5.1)
PROT SERPL-MCNC: 7.3 G/DL (ref 6–8.4)
SODIUM SERPL-SCNC: 142 MMOL/L (ref 136–145)
TRIGL SERPL-MCNC: 88 MG/DL (ref 30–150)

## 2021-06-03 PROCEDURE — 84153 ASSAY OF PSA TOTAL: CPT | Performed by: FAMILY MEDICINE

## 2021-06-03 PROCEDURE — 80053 COMPREHEN METABOLIC PANEL: CPT | Performed by: FAMILY MEDICINE

## 2021-06-03 PROCEDURE — 86703 HIV-1/HIV-2 1 RESULT ANTBDY: CPT | Performed by: FAMILY MEDICINE

## 2021-06-03 PROCEDURE — 36415 COLL VENOUS BLD VENIPUNCTURE: CPT | Mod: PO | Performed by: FAMILY MEDICINE

## 2021-06-03 PROCEDURE — 80061 LIPID PANEL: CPT | Performed by: FAMILY MEDICINE

## 2021-06-03 PROCEDURE — 0002A COVID-19, MRNA, LNP-S, PF, 30 MCG/0.3 ML DOSE VACCINE: CPT | Mod: PBBFAC | Performed by: FAMILY MEDICINE

## 2021-06-03 PROCEDURE — 91300 COVID-19, MRNA, LNP-S, PF, 30 MCG/0.3 ML DOSE VACCINE: CPT | Mod: PBBFAC | Performed by: FAMILY MEDICINE

## 2021-06-04 ENCOUNTER — TELEPHONE (OUTPATIENT)
Dept: FAMILY MEDICINE | Facility: CLINIC | Age: 61
End: 2021-06-04

## 2021-06-04 DIAGNOSIS — R74.01 ELEVATED TRANSAMINASE LEVEL: Primary | ICD-10-CM

## 2021-06-04 LAB — HIV 1+2 AB+HIV1 P24 AG SERPL QL IA: NEGATIVE

## 2021-06-07 ENCOUNTER — TELEPHONE (OUTPATIENT)
Dept: FAMILY MEDICINE | Facility: CLINIC | Age: 61
End: 2021-06-07

## 2021-06-12 ENCOUNTER — PATIENT OUTREACH (OUTPATIENT)
Dept: ADMINISTRATIVE | Facility: HOSPITAL | Age: 61
End: 2021-06-12

## 2021-06-23 ENCOUNTER — PATIENT OUTREACH (OUTPATIENT)
Dept: ADMINISTRATIVE | Facility: OTHER | Age: 61
End: 2021-06-23

## 2021-06-28 ENCOUNTER — TELEPHONE (OUTPATIENT)
Dept: ORTHOPEDICS | Facility: CLINIC | Age: 61
End: 2021-06-28

## 2021-07-07 ENCOUNTER — PATIENT MESSAGE (OUTPATIENT)
Dept: ADMINISTRATIVE | Facility: HOSPITAL | Age: 61
End: 2021-07-07

## 2021-07-07 RX ORDER — GABAPENTIN 800 MG/1
800 TABLET ORAL 3 TIMES DAILY
Qty: 90 TABLET | Refills: 1 | Status: CANCELLED | OUTPATIENT
Start: 2021-07-07

## 2021-07-07 RX ORDER — ALPRAZOLAM 1 MG/1
1 TABLET ORAL 2 TIMES DAILY
Qty: 14 TABLET | Refills: 0 | Status: CANCELLED | OUTPATIENT
Start: 2021-07-07

## 2021-09-20 ENCOUNTER — TELEPHONE (OUTPATIENT)
Dept: FAMILY MEDICINE | Facility: CLINIC | Age: 61
End: 2021-09-20

## 2021-10-15 ENCOUNTER — TELEPHONE (OUTPATIENT)
Dept: FAMILY MEDICINE | Facility: CLINIC | Age: 61
End: 2021-10-15

## 2021-10-15 NOTE — TELEPHONE ENCOUNTER
----- Message from Toyin Abreu sent at 10/15/2021  2:30 PM CDT -----  Contact: pt  Type:  RX Refill Request    Who Called:  patient     Refill or New Rx:  #refil     RX Name and Strength:  ##ALPRAZolam (XANAX) 1 MG tablet#    How is the patient currently taking it? (ex. 1XDay): twice a day     Is this a 30 day or 90 day RX: 90#    Preferred Pharmacy with phone number:  - [facility] - [facility] [address] [address]    Local or Mail Order:  local    Ordering Provider:  Kendell Carranza Call Back Number:  [phone]    Additional Information:  Patient just got off the phone with his pharmacy his med was not received by the pharmacy.

## 2021-10-17 RX ORDER — ALPRAZOLAM 1 MG/1
1 TABLET ORAL 2 TIMES DAILY
Qty: 180 TABLET | Refills: 0 | Status: SHIPPED | OUTPATIENT
Start: 2021-10-17 | End: 2022-01-05 | Stop reason: SDUPTHER

## 2021-12-09 ENCOUNTER — PATIENT OUTREACH (OUTPATIENT)
Dept: ADMINISTRATIVE | Facility: HOSPITAL | Age: 61
End: 2021-12-09
Payer: MEDICARE

## 2021-12-23 ENCOUNTER — PATIENT MESSAGE (OUTPATIENT)
Dept: FAMILY MEDICINE | Facility: CLINIC | Age: 61
End: 2021-12-23
Payer: MEDICARE

## 2021-12-27 ENCOUNTER — PATIENT MESSAGE (OUTPATIENT)
Dept: FAMILY MEDICINE | Facility: CLINIC | Age: 61
End: 2021-12-27
Payer: MEDICARE

## 2021-12-28 RX ORDER — GABAPENTIN 800 MG/1
800 TABLET ORAL 3 TIMES DAILY
Qty: 90 TABLET | Refills: 1 | Status: SHIPPED | OUTPATIENT
Start: 2021-12-28 | End: 2022-01-05 | Stop reason: SDUPTHER

## 2022-01-05 ENCOUNTER — OFFICE VISIT (OUTPATIENT)
Dept: FAMILY MEDICINE | Facility: CLINIC | Age: 62
End: 2022-01-05
Payer: MEDICARE

## 2022-01-05 ENCOUNTER — LAB VISIT (OUTPATIENT)
Dept: LAB | Facility: HOSPITAL | Age: 62
End: 2022-01-05
Attending: FAMILY MEDICINE
Payer: MEDICARE

## 2022-01-05 VITALS
SYSTOLIC BLOOD PRESSURE: 136 MMHG | HEART RATE: 74 BPM | BODY MASS INDEX: 37.08 KG/M2 | HEIGHT: 71 IN | DIASTOLIC BLOOD PRESSURE: 84 MMHG | WEIGHT: 264.88 LBS

## 2022-01-05 DIAGNOSIS — R74.01 ELEVATED TRANSAMINASE LEVEL: ICD-10-CM

## 2022-01-05 DIAGNOSIS — Z12.11 COLON CANCER SCREENING: Primary | ICD-10-CM

## 2022-01-05 LAB
ALBUMIN SERPL BCP-MCNC: 3.6 G/DL (ref 3.5–5.2)
ALP SERPL-CCNC: 77 U/L (ref 55–135)
ALT SERPL W/O P-5'-P-CCNC: 31 U/L (ref 10–44)
ANION GAP SERPL CALC-SCNC: 8 MMOL/L (ref 8–16)
AST SERPL-CCNC: 23 U/L (ref 10–40)
BILIRUB SERPL-MCNC: 0.3 MG/DL (ref 0.1–1)
BUN SERPL-MCNC: 10 MG/DL (ref 6–20)
CALCIUM SERPL-MCNC: 8.7 MG/DL (ref 8.7–10.5)
CHLORIDE SERPL-SCNC: 110 MMOL/L (ref 95–110)
CO2 SERPL-SCNC: 24 MMOL/L (ref 23–29)
CREAT SERPL-MCNC: 0.7 MG/DL (ref 0.5–1.4)
EST. GFR  (AFRICAN AMERICAN): >60 ML/MIN/1.73 M^2
EST. GFR  (NON AFRICAN AMERICAN): >60 ML/MIN/1.73 M^2
GLUCOSE SERPL-MCNC: 132 MG/DL (ref 70–110)
IRON SERPL-MCNC: 50 UG/DL (ref 45–160)
POTASSIUM SERPL-SCNC: 4.1 MMOL/L (ref 3.5–5.1)
PROT SERPL-MCNC: 7.6 G/DL (ref 6–8.4)
SATURATED IRON: 14 % (ref 20–50)
SODIUM SERPL-SCNC: 142 MMOL/L (ref 136–145)
TOTAL IRON BINDING CAPACITY: 348 UG/DL (ref 250–450)
TRANSFERRIN SERPL-MCNC: 235 MG/DL (ref 200–375)

## 2022-01-05 PROCEDURE — 3075F SYST BP GE 130 - 139MM HG: CPT | Mod: CPTII,S$GLB,, | Performed by: FAMILY MEDICINE

## 2022-01-05 PROCEDURE — 84466 ASSAY OF TRANSFERRIN: CPT | Performed by: FAMILY MEDICINE

## 2022-01-05 PROCEDURE — 87340 HEPATITIS B SURFACE AG IA: CPT | Performed by: FAMILY MEDICINE

## 2022-01-05 PROCEDURE — 1159F PR MEDICATION LIST DOCUMENTED IN MEDICAL RECORD: ICD-10-PCS | Mod: CPTII,S$GLB,, | Performed by: FAMILY MEDICINE

## 2022-01-05 PROCEDURE — 90686 FLU VACCINE (QUAD) GREATER THAN OR EQUAL TO 3YO PRESERVATIVE FREE IM: ICD-10-PCS | Mod: S$GLB,,, | Performed by: FAMILY MEDICINE

## 2022-01-05 PROCEDURE — 36415 COLL VENOUS BLD VENIPUNCTURE: CPT | Mod: PN | Performed by: FAMILY MEDICINE

## 2022-01-05 PROCEDURE — 3008F PR BODY MASS INDEX (BMI) DOCUMENTED: ICD-10-PCS | Mod: CPTII,S$GLB,, | Performed by: FAMILY MEDICINE

## 2022-01-05 PROCEDURE — G0008 ADMIN INFLUENZA VIRUS VAC: HCPCS | Mod: S$GLB,,, | Performed by: FAMILY MEDICINE

## 2022-01-05 PROCEDURE — 1159F MED LIST DOCD IN RCRD: CPT | Mod: CPTII,S$GLB,, | Performed by: FAMILY MEDICINE

## 2022-01-05 PROCEDURE — 99214 OFFICE O/P EST MOD 30 MIN: CPT | Mod: S$GLB,,, | Performed by: FAMILY MEDICINE

## 2022-01-05 PROCEDURE — 99214 PR OFFICE/OUTPT VISIT, EST, LEVL IV, 30-39 MIN: ICD-10-PCS | Mod: S$GLB,,, | Performed by: FAMILY MEDICINE

## 2022-01-05 PROCEDURE — 3075F PR MOST RECENT SYSTOLIC BLOOD PRESS GE 130-139MM HG: ICD-10-PCS | Mod: CPTII,S$GLB,, | Performed by: FAMILY MEDICINE

## 2022-01-05 PROCEDURE — 1160F RVW MEDS BY RX/DR IN RCRD: CPT | Mod: CPTII,S$GLB,, | Performed by: FAMILY MEDICINE

## 2022-01-05 PROCEDURE — 99999 PR PBB SHADOW E&M-EST. PATIENT-LVL IV: CPT | Mod: PBBFAC,,, | Performed by: FAMILY MEDICINE

## 2022-01-05 PROCEDURE — 3079F DIAST BP 80-89 MM HG: CPT | Mod: CPTII,S$GLB,, | Performed by: FAMILY MEDICINE

## 2022-01-05 PROCEDURE — 1160F PR REVIEW ALL MEDS BY PRESCRIBER/CLIN PHARMACIST DOCUMENTED: ICD-10-PCS | Mod: CPTII,S$GLB,, | Performed by: FAMILY MEDICINE

## 2022-01-05 PROCEDURE — 80053 COMPREHEN METABOLIC PANEL: CPT | Performed by: FAMILY MEDICINE

## 2022-01-05 PROCEDURE — 86803 HEPATITIS C AB TEST: CPT | Performed by: FAMILY MEDICINE

## 2022-01-05 PROCEDURE — G0008 FLU VACCINE (QUAD) GREATER THAN OR EQUAL TO 3YO PRESERVATIVE FREE IM: ICD-10-PCS | Mod: S$GLB,,, | Performed by: FAMILY MEDICINE

## 2022-01-05 PROCEDURE — 3008F BODY MASS INDEX DOCD: CPT | Mod: CPTII,S$GLB,, | Performed by: FAMILY MEDICINE

## 2022-01-05 PROCEDURE — 99999 PR PBB SHADOW E&M-EST. PATIENT-LVL IV: ICD-10-PCS | Mod: PBBFAC,,, | Performed by: FAMILY MEDICINE

## 2022-01-05 PROCEDURE — 90686 IIV4 VACC NO PRSV 0.5 ML IM: CPT | Mod: S$GLB,,, | Performed by: FAMILY MEDICINE

## 2022-01-05 PROCEDURE — 3079F PR MOST RECENT DIASTOLIC BLOOD PRESSURE 80-89 MM HG: ICD-10-PCS | Mod: CPTII,S$GLB,, | Performed by: FAMILY MEDICINE

## 2022-01-05 RX ORDER — IPRATROPIUM BROMIDE AND ALBUTEROL SULFATE 2.5; .5 MG/3ML; MG/3ML
3 SOLUTION RESPIRATORY (INHALATION) EVERY 4 HOURS PRN
Qty: 75 ML | Refills: 5 | Status: SHIPPED | OUTPATIENT
Start: 2022-01-05 | End: 2022-06-08 | Stop reason: SDUPTHER

## 2022-01-05 RX ORDER — ALPRAZOLAM 1 MG/1
1 TABLET ORAL 2 TIMES DAILY
Qty: 180 TABLET | Refills: 0 | Status: SHIPPED | OUTPATIENT
Start: 2022-01-05 | End: 2022-03-23

## 2022-01-05 RX ORDER — GABAPENTIN 800 MG/1
800 TABLET ORAL 3 TIMES DAILY
Qty: 90 TABLET | Refills: 1 | Status: SHIPPED | OUTPATIENT
Start: 2022-01-05 | End: 2022-03-23 | Stop reason: SDUPTHER

## 2022-01-05 NOTE — PATIENT INSTRUCTIONS
Thank you for contacting [facility].     Anyone age 18 years or older is now eligible for all COVID-19 vaccines and anyone who 16 years or older is eligible for the Pfizer vaccine.     We are offering scheduling for new first dose appointments at select locations on a first come, first serve basis. [facility] users can check availability and schedule their vaccinations via [facility]. If you don't have a [facility] account, you can sign up at [email or web address], call [phone] or visit [email or web address] for available locations and times. Once available vaccine slots are filled, you will no longer be able to see the option to book.  As eager as we are to vaccinate as many patients as possible, you must have a scheduled appointment to receive a vaccine.    We encourage you to visit the [facility] website at cov[place]ine.[place].gov for available vaccine locations across the state.   Please visit [email or web address] for updated information.

## 2022-01-05 NOTE — PROGRESS NOTES
"Subjective:       Patient ID: Cholo Nogueira is a 61 y.o. male.    Chief Complaint: Follow-up (Pt had pacemaker placement in about Aug-Sept. Hosp f/u. Pt still has to make Cardio f/u appt.)    He is here for follow-up.  He had severe bradycardia in September and had a pacemaker placed.  He has a history of chronic pain and continues on Suboxone.  He has gained a lot of weight.  He went from 212 lb to 265 lb over the past 2 years.  His pain physician is Dr. Jason Justice.  He has been consuming a lot of soft drinks.    Follow-up  Associated symptoms include chest pain. Pertinent negatives include no abdominal pain or coughing.     Review of Systems   Respiratory: Negative for cough and wheezing.    Cardiovascular: Positive for chest pain.   Gastrointestinal: Negative for abdominal pain.   Musculoskeletal: Positive for back pain.         Objective:     Blood pressure 136/84, pulse 74, height 5' 11" (1.803 m), weight 120.1 kg (264 lb 14.1 oz).      Physical Exam  Constitutional:       General: He is not in acute distress.     Appearance: He is obese.   Cardiovascular:      Rate and Rhythm: Normal rate and regular rhythm.   Pulmonary:      Effort: No respiratory distress.      Breath sounds: No wheezing or rales.   Musculoskeletal:      Right lower leg: No edema.      Left lower leg: No edema.         Assessment:       Problem List Items Addressed This Visit    None     Visit Diagnoses     Colon cancer screening    -  Primary    Relevant Orders    Fecal Immunochemical Test (iFOBT)          Plan:       Continue gabapentin 800 mg t.i.d..  I refilled his Xanax to take 1 mg twice a day as needed.  He has been on a stable dose with no miss use history.    flu shot today.  Fit kit today.  He seems open to getting the COVID vaccine and I talked to him about how to make an appointment.  We discussed dietary precautions to try to lose weight.  That will make his back pain easier to deal with.    "

## 2022-01-06 LAB
HBV SURFACE AG SERPL QL IA: NEGATIVE
HCV AB SERPL QL IA: POSITIVE

## 2022-01-11 ENCOUNTER — TELEPHONE (OUTPATIENT)
Dept: FAMILY MEDICINE | Facility: CLINIC | Age: 62
End: 2022-01-11
Payer: MEDICARE

## 2022-01-11 DIAGNOSIS — R76.8 HEPATITIS C ANTIBODY POSITIVE IN BLOOD: Primary | ICD-10-CM

## 2022-01-11 NOTE — TELEPHONE ENCOUNTER
I spoke with him.  He has antibody to hepatitis C. He also had a positive test 3 years ago.  He does not recall ever having been treated.  I will order viral titer and refer him if appropriate.  Please call and set up an nonfasting blood test.

## 2022-01-11 NOTE — TELEPHONE ENCOUNTER
Spoke with pt. Needs to confirm transportation. Will contact clinic to schedule appt once confirmed.

## 2022-02-07 ENCOUNTER — PATIENT OUTREACH (OUTPATIENT)
Dept: ADMINISTRATIVE | Facility: HOSPITAL | Age: 62
End: 2022-02-07
Payer: MEDICARE

## 2022-02-07 NOTE — PROGRESS NOTES
FIT KIT DISPENSED REPORT,    HAS YET TO MAIL BACK IN.      States has a fit kit and will complete it soon and drop off at the lab.

## 2022-03-23 ENCOUNTER — PATIENT MESSAGE (OUTPATIENT)
Dept: FAMILY MEDICINE | Facility: CLINIC | Age: 62
End: 2022-03-23
Payer: MEDICARE

## 2022-03-23 RX ORDER — ALPRAZOLAM 1 MG/1
1 TABLET ORAL 2 TIMES DAILY
Qty: 180 TABLET | Refills: 0 | Status: SHIPPED | OUTPATIENT
Start: 2022-03-23 | End: 2022-05-11 | Stop reason: SDUPTHER

## 2022-03-23 RX ORDER — GABAPENTIN 800 MG/1
800 TABLET ORAL 3 TIMES DAILY
Qty: 90 TABLET | Refills: 1 | OUTPATIENT
Start: 2022-03-23

## 2022-03-23 NOTE — TELEPHONE ENCOUNTER
"----- Message from Joy rPyor sent at 3/23/2022  1:37 PM CDT -----  "Type:  Patient Call Back    Who Called:Sukhjinder ([facility])    What is the reqeust in detail:Requesting call back in regards to pt prescription that was fax over on 3/23. Please advise    Can the clinic reply by [facility]? no    Best Call Back Number:[phone]      Additional Information:call a few times  for gabapentin (NEURONTIN) 800 MG tablet    Its not a refill to soon pt is due for medication and is out.         "

## 2022-03-23 NOTE — TELEPHONE ENCOUNTER
No new care gaps identified.  Powered by [employer]. Reference number: [id number].   3/23/2022 5:25:34 PM CDT

## 2022-03-24 RX ORDER — GABAPENTIN 800 MG/1
800 TABLET ORAL 3 TIMES DAILY
Qty: 90 TABLET | Refills: 1 | Status: SHIPPED | OUTPATIENT
Start: 2022-03-24 | End: 2022-05-11 | Stop reason: SDUPTHER

## 2022-04-04 ENCOUNTER — TELEPHONE (OUTPATIENT)
Dept: FAMILY MEDICINE | Facility: CLINIC | Age: 62
End: 2022-04-04
Payer: MEDICARE

## 2022-04-04 ENCOUNTER — PATIENT MESSAGE (OUTPATIENT)
Dept: FAMILY MEDICINE | Facility: CLINIC | Age: 62
End: 2022-04-04
Payer: MEDICARE

## 2022-04-04 NOTE — TELEPHONE ENCOUNTER
Called pharmacy, Olivier states Rx is ready for pickup for pt  Called ptFROYLAN for him to call back

## 2022-04-04 NOTE — TELEPHONE ENCOUNTER
----- Message from Kendal Steiner sent at 4/4/2022  2:30 PM CDT -----  Contact: [facility]  Type: Pharmacy Prior Authorization    Who Called: [facility]      Best Call Back Number: [phone]  Additional Info: Requesting a call back regarding Gapapenitn.  denied saying too early. The last time it was filled was in January so its not too early.       Please advise-Thank You~

## 2022-04-14 ENCOUNTER — TELEPHONE (OUTPATIENT)
Dept: FAMILY MEDICINE | Facility: CLINIC | Age: 62
End: 2022-04-14
Payer: MEDICARE

## 2022-04-14 NOTE — TELEPHONE ENCOUNTER
----- Message from Patrice Chang sent at 4/14/2022 12:52 PM CDT -----  Type: Needs Medical Advice  Who Called: Patient    Pharmacy name and phone #:        [facility] - [place], [place] - [address]  [address] [place] [address]  Phone: [phone] Fax: [phone]      Best Call Back Number: [phone]  Additional Information: Patient states that his refill isn't at the pharmacy:  gabapentin (NEURONTIN) 800 MG tablet    Please call to advise

## 2022-04-14 NOTE — TELEPHONE ENCOUNTER
Pt's gabapentin rx went to [facility] on 3/24/22, #90 x 1R.  Called pt to discuss and to try and sched appt to estab w/ new PCP. No answer. Left msg for pt to call back.

## 2022-04-27 ENCOUNTER — PATIENT OUTREACH (OUTPATIENT)
Dept: ADMINISTRATIVE | Facility: HOSPITAL | Age: 62
End: 2022-04-27
Payer: MEDICARE

## 2022-04-27 ENCOUNTER — PATIENT MESSAGE (OUTPATIENT)
Dept: ADMINISTRATIVE | Facility: HOSPITAL | Age: 62
End: 2022-04-27
Payer: MEDICARE

## 2022-04-27 NOTE — PROGRESS NOTES
2022 Care Everywhere updates requested and reviewed.  Immunizations reconciled. Media reports reviewed.  Duplicate HM overrides and  orders removed.  Overdue HM topic chart audit and/or requested.  Overdue lab testing linked to upcoming lab appointments if applies.    Health Maintenance Due   Topic Date Due    TETANUS VACCINE  Never done    Colorectal Cancer Screening  Never done    Shingles Vaccine (1 of 2) Never done    Pneumococcal Vaccines (Age 0-64) (2 - PCV) 2017    COVID-19 Vaccine (3 - Booster for Pfizer series) 2021

## 2022-04-27 NOTE — LETTER
May 4, 2022    Cholo Nogueira  [address] [address]             [facility]  [address]  [facility] [address]  Phone: [phone] Dear Kenneth, [facility] committed to your overall health.  To help you get the most out of each of your visits, we will review your information to make sure you are up to date on all of your recommended tests and/or procedures.       As a new patient to Ynes Calderon MD, we may not have your complete medical records.  After reviewing your chart have found that you may be due for the following:         Health Maintenance Due   Topic    TETANUS VACCINE     Colorectal Cancer Screening     Shingles Vaccine     Pneumococcal Vaccines     COVID-19 Vaccine (3 - Booster for Pfizer series)         If you have had any of the above done at another facility, please bring the records or information with you so that your record at [facility] will be complete.       If you are currently taking medication, please bring it with you to your appointment for review.     Thank you for letting us care for you,        Your [facility]

## 2022-05-11 ENCOUNTER — TELEPHONE (OUTPATIENT)
Dept: FAMILY MEDICINE | Facility: CLINIC | Age: 62
End: 2022-05-11

## 2022-05-11 ENCOUNTER — OFFICE VISIT (OUTPATIENT)
Dept: FAMILY MEDICINE | Facility: CLINIC | Age: 62
End: 2022-05-11
Payer: MEDICARE

## 2022-05-11 ENCOUNTER — LAB VISIT (OUTPATIENT)
Dept: LAB | Facility: HOSPITAL | Age: 62
End: 2022-05-11
Attending: INTERNAL MEDICINE
Payer: MEDICARE

## 2022-05-11 VITALS
BODY MASS INDEX: 33.04 KG/M2 | HEART RATE: 78 BPM | WEIGHT: 236 LBS | DIASTOLIC BLOOD PRESSURE: 80 MMHG | OXYGEN SATURATION: 96 % | SYSTOLIC BLOOD PRESSURE: 138 MMHG | HEIGHT: 71 IN

## 2022-05-11 DIAGNOSIS — Z79.891 LONG TERM (CURRENT) USE OF OPIATE ANALGESIC: ICD-10-CM

## 2022-05-11 DIAGNOSIS — F11.20 NARCOTIC DEPENDENCE: ICD-10-CM

## 2022-05-11 DIAGNOSIS — M51.36 DDD (DEGENERATIVE DISC DISEASE), LUMBAR: ICD-10-CM

## 2022-05-11 DIAGNOSIS — R76.8 POSITIVE HEPATITIS C ANTIBODY TEST: ICD-10-CM

## 2022-05-11 DIAGNOSIS — F17.210 NICOTINE DEPENDENCE, CIGARETTES, UNCOMPLICATED: ICD-10-CM

## 2022-05-11 DIAGNOSIS — R06.00 DYSPNEA, UNSPECIFIED TYPE: ICD-10-CM

## 2022-05-11 DIAGNOSIS — E66.9 OBESITY, UNSPECIFIED CLASSIFICATION, UNSPECIFIED OBESITY TYPE, UNSPECIFIED WHETHER SERIOUS COMORBIDITY PRESENT: ICD-10-CM

## 2022-05-11 DIAGNOSIS — G89.4 CHRONIC PAIN SYNDROME: Primary | ICD-10-CM

## 2022-05-11 DIAGNOSIS — Z23 NEED FOR VACCINATION: ICD-10-CM

## 2022-05-11 DIAGNOSIS — Z12.2 SCREENING FOR MALIGNANT NEOPLASM OF RESPIRATORY ORGAN: ICD-10-CM

## 2022-05-11 DIAGNOSIS — F17.200 TOBACCO DEPENDENCE: ICD-10-CM

## 2022-05-11 DIAGNOSIS — R53.83 FATIGUE, UNSPECIFIED TYPE: ICD-10-CM

## 2022-05-11 DIAGNOSIS — F41.9 ANXIETY: ICD-10-CM

## 2022-05-11 DIAGNOSIS — Z12.11 SCREENING FOR MALIGNANT NEOPLASM OF COLON: ICD-10-CM

## 2022-05-11 PROBLEM — J41.0 SIMPLE CHRONIC BRONCHITIS: Status: RESOLVED | Noted: 2020-04-08 | Resolved: 2022-05-11

## 2022-05-11 PROCEDURE — 87522 HEPATITIS C REVRS TRNSCRPJ: CPT | Performed by: INTERNAL MEDICINE

## 2022-05-11 PROCEDURE — G0009 PNEUMOCOCCAL CONJUGATE VACCINE 20-VALENT: ICD-10-PCS | Mod: S$GLB,,, | Performed by: INTERNAL MEDICINE

## 2022-05-11 PROCEDURE — 1159F PR MEDICATION LIST DOCUMENTED IN MEDICAL RECORD: ICD-10-PCS | Mod: CPTII,S$GLB,, | Performed by: INTERNAL MEDICINE

## 2022-05-11 PROCEDURE — 3008F BODY MASS INDEX DOCD: CPT | Mod: CPTII,S$GLB,, | Performed by: INTERNAL MEDICINE

## 2022-05-11 PROCEDURE — 1160F PR REVIEW ALL MEDS BY PRESCRIBER/CLIN PHARMACIST DOCUMENTED: ICD-10-PCS | Mod: CPTII,S$GLB,, | Performed by: INTERNAL MEDICINE

## 2022-05-11 PROCEDURE — 90677 PNEUMOCOCCAL CONJUGATE VACCINE 20-VALENT: ICD-10-PCS | Mod: S$GLB,,, | Performed by: INTERNAL MEDICINE

## 2022-05-11 PROCEDURE — 1159F MED LIST DOCD IN RCRD: CPT | Mod: CPTII,S$GLB,, | Performed by: INTERNAL MEDICINE

## 2022-05-11 PROCEDURE — 36415 COLL VENOUS BLD VENIPUNCTURE: CPT | Mod: PN | Performed by: INTERNAL MEDICINE

## 2022-05-11 PROCEDURE — 80053 COMPREHEN METABOLIC PANEL: CPT | Performed by: INTERNAL MEDICINE

## 2022-05-11 PROCEDURE — 99215 PR OFFICE/OUTPT VISIT, EST, LEVL V, 40-54 MIN: ICD-10-PCS | Mod: 25,S$GLB,, | Performed by: INTERNAL MEDICINE

## 2022-05-11 PROCEDURE — G0009 ADMIN PNEUMOCOCCAL VACCINE: HCPCS | Mod: S$GLB,,, | Performed by: INTERNAL MEDICINE

## 2022-05-11 PROCEDURE — 83036 HEMOGLOBIN GLYCOSYLATED A1C: CPT | Performed by: INTERNAL MEDICINE

## 2022-05-11 PROCEDURE — 90677 PCV20 VACCINE IM: CPT | Mod: S$GLB,,, | Performed by: INTERNAL MEDICINE

## 2022-05-11 PROCEDURE — 99215 OFFICE O/P EST HI 40 MIN: CPT | Mod: 25,S$GLB,, | Performed by: INTERNAL MEDICINE

## 2022-05-11 PROCEDURE — 84443 ASSAY THYROID STIM HORMONE: CPT | Performed by: INTERNAL MEDICINE

## 2022-05-11 PROCEDURE — 1160F RVW MEDS BY RX/DR IN RCRD: CPT | Mod: CPTII,S$GLB,, | Performed by: INTERNAL MEDICINE

## 2022-05-11 PROCEDURE — 85025 COMPLETE CBC W/AUTO DIFF WBC: CPT | Performed by: INTERNAL MEDICINE

## 2022-05-11 PROCEDURE — 99999 PR PBB SHADOW E&M-EST. PATIENT-LVL III: ICD-10-PCS | Mod: PBBFAC,,, | Performed by: INTERNAL MEDICINE

## 2022-05-11 PROCEDURE — 99999 PR PBB SHADOW E&M-EST. PATIENT-LVL III: CPT | Mod: PBBFAC,,, | Performed by: INTERNAL MEDICINE

## 2022-05-11 PROCEDURE — 3008F PR BODY MASS INDEX (BMI) DOCUMENTED: ICD-10-PCS | Mod: CPTII,S$GLB,, | Performed by: INTERNAL MEDICINE

## 2022-05-11 RX ORDER — ALPRAZOLAM 1 MG/1
1 TABLET ORAL 2 TIMES DAILY PRN
Qty: 60 TABLET | Refills: 0 | Status: SHIPPED | OUTPATIENT
Start: 2022-05-11 | End: 2022-08-09

## 2022-05-11 RX ORDER — GABAPENTIN 800 MG/1
800 TABLET ORAL 3 TIMES DAILY
Qty: 90 TABLET | Refills: 0 | Status: SHIPPED | OUTPATIENT
Start: 2022-05-11 | End: 2022-07-08

## 2022-05-11 NOTE — PROGRESS NOTES
Assessment and Plan:    1. Chronic pain syndrome  2. Narcotic dependence  3. Long term (current) use of opiate analgesic   - Hemoglobin A1C; Future  4. DDD (degenerative disc disease), lumbar  - gabapentin (NEURONTIN) 800 MG tablet; Take 1 tablet (800 mg total) by mouth 3 (three) times daily.  Dispense: 90 tablet; Refill: 0  Discussed that I do not typically prescribe high dose gabapentin to patients also currently taking narcotics. Discussed that if he is already seeing a pain management physician, it would be more appropriate to have this physician manage gabapentin. Given 1 month of this medication.     5. Anxiety  Discussed in great detail that I do not prescribe benzodiazepines for regular use in general, but this is especially dangerous in someone who is taking suboxone. We discussed that I will definitely not be continuing this medication. Discussed that if patient chooses to follow up with me, would try different medications for anxiety in the SSRI or SNRI class, and could at most prescribe a tapering dose of xanax, but this would not be continued long term at all. Discussed option of seeing psychiatry to discuss options for anxiety. Patient declined all of these and states that he intends to continue this. I have prescribed 1 month of this medication and patient was made aware that I would not be continuing this medication after this 1 month.   - ALPRAZolam (XANAX) 1 MG tablet; Take 1 tablet (1 mg total) by mouth 2 (two) times daily as needed for Anxiety.  Dispense: 60 tablet; Refill: 0    6. Positive hepatitis C antibody test  Need to obtain quant and would consider treatment if positive. Discussed that if this is positive liver imaging would be part of workup with hepatology.  - Comprehensive Metabolic Panel; Future  - HEPATITIS C RNA, QUANTITATIVE, PCR; Future    7. Dyspnea, unspecified type  Discussed concerns about possible COPD. Recommend PFTs. Start trelegy to see if this is effective.  -  fluticasone-umeclidin-vilanter (TRELEGY ELLIPTA) 100-62.5-25 mcg DsDv; Inhale 1 puff into the lungs once daily.  Dispense: 60 each; Refill: 1  - Complete PFT w/ bronchodilator; Future  - CBC Auto Differential; Future    8. Fatigue, unspecified type  Will obtain labs.   - TSH; Future    9. Obesity, unspecified classification, unspecified obesity type, unspecified whether serious comorbidity present  - Hemoglobin A1C; Future    10. Screening for malignant neoplasm of respiratory organ  Recommend lung CT with smoking history  - CT Chest Lung Screening Low Dose; Future    11. Tobacco dependence  - CT Chest Lung Screening Low Dose; Future    12. Nicotine dependence, cigarettes, uncomplicated   - CT Chest Lung Screening Low Dose; Future    13. Screening for malignant neoplasm of colon  - Fecal Immunochemical Test (iFOBT); Future    14. Need for vaccination  - (In Office Administered) Pneumococcal Conjugate Vaccine (20 Valent) (IM)      ______________________________________________________________________  Subjective:    Chief Complaint:  Establish care    HPI:  Cholo is a 61 y.o. year old man here to establish care. Former patient of Dr. Rdz.    Chronic pain 2/2 DDD- Seeing Dr. Cruz for this and is maintained on suboxone. He is also taking gabapentin 800 mg TID which had been prescribed by Dr. Rdz. Patient reports he is not sure why this is not being managed by his pain management doctor.    Anxiety- Has been taking xanax 1 mg BID for several years. Reports that he has not ever tried taking anything else for anxiety and does not want to try anything else. He reports that he is not interested in seeing a psychiatrist as he does not feel like his anxiety is this bad.    He has a pacemaker in place. Sees Dr. Nova for cardiologist. He reports that he has recently had a full cardiac workup. He reports that he had been having significant fatigue and dyspnea prior to this being placed. The symptoms did improve after  the pacemaker was placed, but he reports that the fatigue and dyspnea are coming back and he is concerned about this. He is a long term smoker. He reports that he has been getting increasingly concerned that he could have cancer as a cause of the symptoms. He is not up to date on colon cancer screening or lung cancer screening.     He reports that he has never been diagnosed with COPD. He reports that he is not sure why his chart says that he has this. He denies ever having had PFTs. He has duo-nebs at home but does not often use this. He has never been on a controller inhaler. He has been having more wheezing lately.     He reports having been told before that he had hep B, but this subsequently has cleared. He also notes that he has been told before that he has hep C, but he had not followed up about this. He has not ever been treated for hep B or C.     Past Medical History:  Past Medical History:   Diagnosis Date    Anticoagulant long-term use     ASA daily, very low dose, 1/4 of 325mg    Anxiety     Arthritis     Bacteremia     with acute kidney failure    DDD (degenerative disc disease), lumbar     Depression     [place] [facility]    GERD (gastroesophageal reflux disease)     associated with ASA use    Hepatitis B     History of liver failure     Low back pain 5/2/2012    Lumbar vertebral fracture 1985    Mobility impaired     Back pain and chest weakness, using walker    Nephrolithiasis     Neuralgia     Osteomyelitis     Abscess of Bilatteral Steroclavicular joints    Peptic ulcer disease        Past Surgical History:  Past Surgical History:   Procedure Laterality Date    Epidural Steroid injection      Pain management    FINGER AMPUTATION Right 5/9/2020    Procedure: AMPUTATION, FINGER-Index tip;  Surgeon: Luis Herring MD;  Location: [facility];  Service: Orthopedics;  Laterality: Right;    I&D sternoclavicular joint abscesses      3/2012 -4/2012, 3 surgeries, wound vac     IRRIGATION AND DEBRIDEMENT OF UPPER EXTREMITY Right 5/5/2020    Procedure: IRRIGATION AND DEBRIDEMENt Right Index Finger;  Surgeon: Luis Herring MD;  Location: [facility];  Service: Orthopedics;  Laterality: Right;    TONSILLECTOMY         Family History:  Family History   Problem Relation Age of Onset    Diabetes Mother     Cholelithiasis Mother     Cancer Mother         Thyroid    Alcohol abuse Mother     Stroke Mother     Diabetes Maternal Grandfather     Alcohol abuse Father     Melanoma Neg Hx     Psoriasis Neg Hx     Lupus Neg Hx     Eczema Neg Hx        Social History:  Social History     Socioeconomic History    Marital status:    Tobacco Use    Smoking status: Current Some Day Smoker     Packs/day: 0.50     Years: 20.00     Pack years: 10.00     Types: Cigarettes     Start date: 7/28/1978    Smokeless tobacco: Never Used   Substance and Sexual Activity    Alcohol use: No    Drug use: No     Types: Benzodiazepines, Other-see comments, Hydromorphone     Comment: Past history of narcotic dependence.  He denies IV use/ methadone       Medications:  Current Outpatient Medications on File Prior to Visit   Medication Sig Dispense Refill    albuterol-ipratropium (DUO-NEB) 2.5 mg-0.5 mg/3 mL nebulizer solution Take 3 mLs by nebulization every 4 (four) hours as needed for Shortness of Breath. Rescue 75 mL 5    ascorbic acid, vitamin C, (VITAMIN C) 500 MG tablet Take 1,000 mg by mouth once daily.       aspirin (ECOTRIN) 81 MG EC tablet Take 81 mg by mouth.      b complex vitamins capsule Take 1 capsule by mouth once daily.      buprenorphine-naloxone (SUBOXONE) 8-2 mg Film Place 3 packets (3 each total) under the tongue once daily. Take 2.5 Films per day (Patient taking differently: Place 1 each under the tongue 3 (three) times daily.)      multivitamin (THERAGRAN) per tablet Take 1 tablet by mouth once daily.      [DISCONTINUED] ALPRAZolam (XANAX) 1 MG tablet TAKE 1 TABLET (1 MG  "TOTAL) BY MOUTH 2 (TWO) TIMES DAILY. 180 tablet 0    [DISCONTINUED] gabapentin (NEURONTIN) 800 MG tablet Take 1 tablet (800 mg total) by mouth 3 (three) times daily. 90 tablet 1     No current facility-administered medications on file prior to visit.       Allergies:  Ms contin [morphine], Soma [carisoprodol], Tylenol [acetaminophen], Ultram [tramadol], Aspirin, and Penicillins    Immunizations:  Immunization History   Administered Date(s) Administered    COVID-19, MRNA, LN-S, PF (Pfizer) (Purple Cap) 05/13/2021, 06/03/2021    Influenza 12/05/2013    Influenza - Quadrivalent - PF *Preferred* (6 months and older) 09/19/2016, 11/08/2018, 03/02/2021, 01/05/2022    Influenza Split 09/27/2014    Pneumococcal Conjugate - 20 Valent 05/11/2022    Pneumococcal Polysaccharide - 23 Valent 09/19/2016       Review of Systems:  Review of Systems   Constitutional: Positive for fatigue. Negative for chills and fever.   Respiratory: Positive for cough, shortness of breath and wheezing.    Cardiovascular: Positive for chest pain (chronic r/t prior surgery). Negative for palpitations.   Musculoskeletal: Positive for back pain.   Skin: Negative for wound.       Objective:    Vitals:  Vitals:    05/11/22 1350   BP: 138/80   Pulse: 78   SpO2: 96%   Weight: 107.1 kg (236 lb)   Height: 5' 11" (1.803 m)   PainSc: 0-No pain       Physical Exam  Vitals reviewed.   Constitutional:       General: He is not in acute distress.     Appearance: He is well-developed.   Eyes:      General: No scleral icterus.  Cardiovascular:      Rate and Rhythm: Normal rate and regular rhythm.   Pulmonary:      Effort: Pulmonary effort is normal. No respiratory distress.      Breath sounds: Normal breath sounds. No wheezing, rhonchi or rales.   Skin:     General: Skin is warm and dry.   Neurological:      Mental Status: He is alert and oriented to person, place, and time.   Psychiatric:         Behavior: Behavior normal.         Body mass index is 32.92 " kg/m².      Data:  PDMP reviewed, Dr. Cruz is current pain management physician    Ynes Calderon MD  Internal Medicine

## 2022-05-11 NOTE — TELEPHONE ENCOUNTER
----- Message from Hattie Grimes sent at 5/11/2022  4:24 PM CDT -----  Contact: Sukhjinder an/ [facility]  Type:  Pharmacy Calling to Clarify an RX    Name of Caller:  Sukhjinder    Pharmacy Name:    [facility] - [place] [address] [address]  [address] [place] [address]  Phone: [phone] Fax: [phone]    Prescription Name:  ALPRAZolam (XANAX) 1 MG tablet    What do they need to clarify?:  They received new Rx, but it doesn't say original Rx was stolen, and they need something stating that it's OK to refill early.    Best Call Back Number:  [phone]    Additional Information:  Please call back.  Thanks.

## 2022-05-12 LAB
ALBUMIN SERPL BCP-MCNC: 3.9 G/DL (ref 3.5–5.2)
ALP SERPL-CCNC: 88 U/L (ref 55–135)
ALT SERPL W/O P-5'-P-CCNC: 40 U/L (ref 10–44)
ANION GAP SERPL CALC-SCNC: 12 MMOL/L (ref 8–16)
AST SERPL-CCNC: 33 U/L (ref 10–40)
BASOPHILS # BLD AUTO: 0.05 K/UL (ref 0–0.2)
BASOPHILS NFR BLD: 0.3 % (ref 0–1.9)
BILIRUB SERPL-MCNC: 0.5 MG/DL (ref 0.1–1)
BUN SERPL-MCNC: 11 MG/DL (ref 6–20)
CALCIUM SERPL-MCNC: 9.1 MG/DL (ref 8.7–10.5)
CHLORIDE SERPL-SCNC: 104 MMOL/L (ref 95–110)
CO2 SERPL-SCNC: 24 MMOL/L (ref 23–29)
CREAT SERPL-MCNC: 0.8 MG/DL (ref 0.5–1.4)
DIFFERENTIAL METHOD: ABNORMAL
EOSINOPHIL # BLD AUTO: 0.3 K/UL (ref 0–0.5)
EOSINOPHIL NFR BLD: 1.8 % (ref 0–8)
ERYTHROCYTE [DISTWIDTH] IN BLOOD BY AUTOMATED COUNT: 13.7 % (ref 11.5–14.5)
EST. GFR  (AFRICAN AMERICAN): >60 ML/MIN/1.73 M^2
EST. GFR  (NON AFRICAN AMERICAN): >60 ML/MIN/1.73 M^2
ESTIMATED AVG GLUCOSE: 140 MG/DL (ref 68–131)
GLUCOSE SERPL-MCNC: 80 MG/DL (ref 70–110)
HBA1C MFR BLD: 6.5 % (ref 4–5.6)
HCT VFR BLD AUTO: 47.7 % (ref 40–54)
HGB BLD-MCNC: 15.7 G/DL (ref 14–18)
IMM GRANULOCYTES # BLD AUTO: 0.06 K/UL (ref 0–0.04)
IMM GRANULOCYTES NFR BLD AUTO: 0.4 % (ref 0–0.5)
LYMPHOCYTES # BLD AUTO: 2.4 K/UL (ref 1–4.8)
LYMPHOCYTES NFR BLD: 16.9 % (ref 18–48)
MCH RBC QN AUTO: 28.4 PG (ref 27–31)
MCHC RBC AUTO-ENTMCNC: 32.9 G/DL (ref 32–36)
MCV RBC AUTO: 86 FL (ref 82–98)
MONOCYTES # BLD AUTO: 1.1 K/UL (ref 0.3–1)
MONOCYTES NFR BLD: 7.5 % (ref 4–15)
NEUTROPHILS # BLD AUTO: 10.5 K/UL (ref 1.8–7.7)
NEUTROPHILS NFR BLD: 73.1 % (ref 38–73)
NRBC BLD-RTO: 0 /100 WBC
PLATELET # BLD AUTO: 155 K/UL (ref 150–450)
PMV BLD AUTO: 12.9 FL (ref 9.2–12.9)
POTASSIUM SERPL-SCNC: 4.5 MMOL/L (ref 3.5–5.1)
PROT SERPL-MCNC: 8.2 G/DL (ref 6–8.4)
RBC # BLD AUTO: 5.52 M/UL (ref 4.6–6.2)
SODIUM SERPL-SCNC: 140 MMOL/L (ref 136–145)
TSH SERPL DL<=0.005 MIU/L-ACNC: 2.14 UIU/ML (ref 0.4–4)
WBC # BLD AUTO: 14.32 K/UL (ref 3.9–12.7)
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
## 2022-05-12 NOTE — TELEPHONE ENCOUNTER
It is NOT OK to refill early. Patient did not say anything about medication being stolen. Date is incorrect (I had forgotten to change this), this should not be filled until 6/22/22. Please correct this.    Additionally, the gabapentin should not be filled until 5/14/22, please correct this as well.

## 2022-05-14 LAB — HCV RNA SERPL NAA+PROBE-ACNC: NORMAL K[IU]/ML

## 2022-05-17 ENCOUNTER — PATIENT MESSAGE (OUTPATIENT)
Dept: FAMILY MEDICINE | Facility: CLINIC | Age: 62
End: 2022-05-17
Payer: MEDICARE

## 2022-05-17 ENCOUNTER — NURSE TRIAGE (OUTPATIENT)
Dept: ADMINISTRATIVE | Facility: CLINIC | Age: 62
End: 2022-05-17
Payer: MEDICARE

## 2022-05-17 DIAGNOSIS — R76.8 POSITIVE HEPATITIS C ANTIBODY TEST: Primary | ICD-10-CM

## 2022-05-17 NOTE — TELEPHONE ENCOUNTER
OOC Rn  Patient transferred to me from  with side pain. Dr. White testing patient for hepatitis.  Side Right pain,  9/10 for a couple of days.     Will not give her medicine,  Wants gabapentin and zanex form  And she would not get her prescription.  Care advise is to go to ED now.  For any new or worsening symptoms to call back OOC RN. Back Enrique SALINAS      Reason for Disposition   SEVERE abdominal pain (e.g., excruciating)    Additional Information   Negative: Passed out (i.e., fainted, collapsed and was not responding)   Negative: Shock suspected (e.g., cold/pale/clammy skin, too weak to stand, low BP, rapid pulse)   Negative: Sounds like a life-threatening emergency to the triager    Protocols used: ABDOMINAL PAIN - MALE-A-OH

## 2022-05-17 NOTE — TELEPHONE ENCOUNTER
"To be clear, patient was not given EARLY refills for the medications that were not yet due to be refilled. He was given a one month prescription to give him time to find a doctor that would manage his medications as he declined my plan to wean off of xanax and try alternative treatment for anxiety.     As for the chronic hepatitis C, it would not be causing pain. If he has new right sided pain that is 9/10, he should go to the ER for evaluation and treatment of this.       Reviewed labs, Hep C quant was not performed, reason listed as "Quantity was not sufficient for testing." Do they need a new sample? This needs to be run.  "

## 2022-05-25 ENCOUNTER — TELEPHONE (OUTPATIENT)
Dept: FAMILY MEDICINE | Facility: CLINIC | Age: 62
End: 2022-05-25
Payer: MEDICARE

## 2022-05-26 NOTE — TELEPHONE ENCOUNTER
Please have patient schedule follow up visit to discuss lab results. Also still need to get blood for the Hep C RNA test.

## 2022-05-27 ENCOUNTER — HOSPITAL ENCOUNTER (OUTPATIENT)
Dept: RADIOLOGY | Facility: HOSPITAL | Age: 62
Discharge: HOME | End: 2022-05-27
Attending: INTERNAL MEDICINE
Payer: MEDICARE

## 2022-05-27 DIAGNOSIS — F17.200 TOBACCO DEPENDENCE: ICD-10-CM

## 2022-05-27 DIAGNOSIS — F17.210 NICOTINE DEPENDENCE, CIGARETTES, UNCOMPLICATED: ICD-10-CM

## 2022-05-27 DIAGNOSIS — Z12.2 SCREENING FOR MALIGNANT NEOPLASM OF RESPIRATORY ORGAN: ICD-10-CM

## 2022-05-27 PROCEDURE — 71271 CT THORAX LUNG CANCER SCR C-: CPT | Mod: TC,PO

## 2022-05-27 PROCEDURE — 71271 CT THORAX LUNG CANCER SCR C-: CPT | Mod: 26,,, | Performed by: RADIOLOGY

## 2022-05-27 PROCEDURE — 71271 CT CHEST LUNG SCREENING LOW DOSE: ICD-10-PCS | Mod: 26,,, | Performed by: RADIOLOGY

## 2022-06-03 ENCOUNTER — OFFICE VISIT (OUTPATIENT)
Dept: FAMILY MEDICINE | Facility: CLINIC | Age: 62
End: 2022-06-03
Payer: MEDICARE

## 2022-06-03 VITALS — BODY MASS INDEX: 32.2 KG/M2 | HEIGHT: 71 IN | WEIGHT: 230 LBS

## 2022-06-03 DIAGNOSIS — B18.2 CHRONIC HEPATITIS C WITHOUT HEPATIC COMA: Primary | ICD-10-CM

## 2022-06-03 DIAGNOSIS — Z12.5 SCREENING FOR MALIGNANT NEOPLASM OF PROSTATE: ICD-10-CM

## 2022-06-03 DIAGNOSIS — E11.9 TYPE 2 DIABETES MELLITUS WITHOUT COMPLICATION, WITHOUT LONG-TERM CURRENT USE OF INSULIN: ICD-10-CM

## 2022-06-03 DIAGNOSIS — R91.8 MULTIPLE PULMONARY NODULES: ICD-10-CM

## 2022-06-03 DIAGNOSIS — J44.9 CHRONIC OBSTRUCTIVE PULMONARY DISEASE, UNSPECIFIED COPD TYPE: ICD-10-CM

## 2022-06-03 DIAGNOSIS — D69.6 THROMBOCYTOPENIA: ICD-10-CM

## 2022-06-03 PROCEDURE — 3008F PR BODY MASS INDEX (BMI) DOCUMENTED: ICD-10-PCS | Mod: CPTII,95,, | Performed by: INTERNAL MEDICINE

## 2022-06-03 PROCEDURE — 1159F PR MEDICATION LIST DOCUMENTED IN MEDICAL RECORD: ICD-10-PCS | Mod: CPTII,95,, | Performed by: INTERNAL MEDICINE

## 2022-06-03 PROCEDURE — 1159F MED LIST DOCD IN RCRD: CPT | Mod: CPTII,95,, | Performed by: INTERNAL MEDICINE

## 2022-06-03 PROCEDURE — 99214 OFFICE O/P EST MOD 30 MIN: CPT | Mod: 95,,, | Performed by: INTERNAL MEDICINE

## 2022-06-03 PROCEDURE — 99214 PR OFFICE/OUTPT VISIT, EST, LEVL IV, 30-39 MIN: ICD-10-PCS | Mod: 95,,, | Performed by: INTERNAL MEDICINE

## 2022-06-03 PROCEDURE — 1160F RVW MEDS BY RX/DR IN RCRD: CPT | Mod: CPTII,95,, | Performed by: INTERNAL MEDICINE

## 2022-06-03 PROCEDURE — 3008F BODY MASS INDEX DOCD: CPT | Mod: CPTII,95,, | Performed by: INTERNAL MEDICINE

## 2022-06-03 PROCEDURE — 1160F PR REVIEW ALL MEDS BY PRESCRIBER/CLIN PHARMACIST DOCUMENTED: ICD-10-PCS | Mod: CPTII,95,, | Performed by: INTERNAL MEDICINE

## 2022-06-03 PROCEDURE — 3044F HG A1C LEVEL LT 7.0%: CPT | Mod: CPTII,95,, | Performed by: INTERNAL MEDICINE

## 2022-06-03 PROCEDURE — 99499 RISK ADDL DX/OHS AUDIT: ICD-10-PCS | Mod: 95,,, | Performed by: INTERNAL MEDICINE

## 2022-06-03 PROCEDURE — 99499 UNLISTED E&M SERVICE: CPT | Mod: 95,,, | Performed by: INTERNAL MEDICINE

## 2022-06-03 PROCEDURE — 3044F PR MOST RECENT HEMOGLOBIN A1C LEVEL <7.0%: ICD-10-PCS | Mod: CPTII,95,, | Performed by: INTERNAL MEDICINE

## 2022-06-03 RX ORDER — METFORMIN HYDROCHLORIDE 500 MG/1
500 TABLET ORAL 2 TIMES DAILY WITH MEALS
Qty: 180 TABLET | Refills: 1 | Status: SHIPPED | OUTPATIENT
Start: 2022-06-03 | End: 2022-06-08 | Stop reason: SDUPTHER

## 2022-06-03 NOTE — PROGRESS NOTES
Assessment and Plan:    1. Chronic hepatitis C without hepatic coma  Discussed general principles of Hep C management including need to see hepatology, probable need to go to [place] for fibroscan, and possible/probable need for treatment with antivirals.   - Ambulatory referral/consult to Hepatitis C Clinic; Future    2. Chronic obstructive pulmonary disease, unspecified COPD type  Continue Trelegy and duonebs PRN. States he needs a new nebulizer. Advised smoking cessation. Discussed recommendation to establish care with Pulmonologist.  - Ambulatory referral/consult to Pulmonology; Future  - CT Chest Without Contrast; Future  - NEBULIZER FOR HOME USE    3. Multiple pulmonary nodules  Lung-RADS 3, follow up in 6 months  - Ambulatory referral/consult to Pulmonology; Future  - CT Chest Without Contrast; Future    4. Type 2 diabetes mellitus without complication, without long-term current use of insulin  New diagnosis today. Briefly discussed due to time constraints. Start metformin. Discussed diet. Follow up in 3 months with repeat A1c.  - Comprehensive Metabolic Panel; Future  - Hemoglobin A1C; Future  - Lipid Panel; Future  - Microalbumin/Creatinine Ratio, Urine; Future  - metFORMIN (GLUCOPHAGE) 500 MG tablet; Take 1 tablet (500 mg total) by mouth 2 (two) times daily with meals.  Dispense: 180 tablet; Refill: 1    5. Thrombocytopenia  New, possibly 2/2 cirrhosis. Will monitor with above.  - CBC Auto Differential; Future    6. Screening for malignant neoplasm of prostate  - PSA, Screening; Future    ______________________________________________________________________  Subjective:    Chief Complaint:  Discuss lab results/ER follow up    HPI:  Cohlo is a 61 y.o. year old male patient with telemedicine visit today as consultation to discuss lab results and for ER follow up. Patient notably 18 minutes late for 20 minute apt, so very limited time to discuss all that needed to be discussed.    The patient location  is: home in [place]  The chief complaint leading to consultation is: as above    Visit type: audiovisual    Face to Face time with patient: 15 minutes  20 minutes of total time spent on the encounter, which includes face to face time and non-face to face time preparing to see the patient (eg, review of tests), Obtaining and/or reviewing separately obtained history, Documenting clinical information in the electronic or other health record, Independently interpreting results (not separately reported) and communicating results to the patient/family/caregiver, or Care coordination (not separately reported).     Each patient to whom he or she provides medical services by telemedicine is:  (1) informed of the relationship between the physician and patient and the respective role of any other health care provider with respect to management of the patient; and (2) notified that he or she may decline to receive medical services by telemedicine and may withdraw from such care at any time.    Notes:     ER- ER visit 5/18 for RUQ pain. US at that time showed hepatomegaly with findings of hepatic steatosis, workup otherwise unremarkable. Advised to follow up outpatient.    COPD- PFTs confirm this previously suspected diagnosis. He reports that he feels shortness of breath with activity. This does limit his activity. He started taking the trelegy 3 weeks ago and he has noticed some improvement in his breathing already with this.    Hep C- Recent labs confirm this diagnosis. No prior treatment.    DM2- Denies any prior diagnosis of diabetes. Has been drinking a lot of mountain dew. Has not been following any diabetic diet previously.       Medications:  Current Outpatient Medications on File Prior to Visit   Medication Sig Dispense Refill    albuterol-ipratropium (DUO-NEB) 2.5 mg-0.5 mg/3 mL nebulizer solution Take 3 mLs by nebulization every 4 (four) hours as needed for Shortness of Breath. Rescue 75 mL 5    ALPRAZolam (XANAX) 1 MG  tablet Take 1 tablet (1 mg total) by mouth 2 (two) times daily as needed for Anxiety. 60 tablet 0    ascorbic acid, vitamin C, (VITAMIN C) 500 MG tablet Take 1,000 mg by mouth once daily.       aspirin (ECOTRIN) 81 MG EC tablet Take 81 mg by mouth.      b complex vitamins capsule Take 1 capsule by mouth once daily.      buprenorphine-naloxone (SUBOXONE) 8-2 mg Film Place 3 packets (3 each total) under the tongue once daily. Take 2.5 Films per day (Patient taking differently: Place 1 each under the tongue 3 (three) times daily.)      fluticasone-umeclidin-vilanter (TRELEGY ELLIPTA) 100-62.5-25 mcg DsDv Inhale 1 puff into the lungs once daily. 60 each 1    gabapentin (NEURONTIN) 800 MG tablet Take 1 tablet (800 mg total) by mouth 3 (three) times daily. 90 tablet 0    multivitamin (THERAGRAN) per tablet Take 1 tablet by mouth once daily.      ondansetron (ZOFRAN-ODT) 4 MG TbDL Take 1 tablet (4 mg total) by mouth every 6 (six) hours as needed. 10 tablet 0     No current facility-administered medications on file prior to visit.       Review of Systems:  Review of Systems   Constitutional: Positive for fatigue. Negative for chills and fever.   Respiratory: Positive for shortness of breath. Negative for chest tightness and wheezing.    Cardiovascular: Negative for chest pain and palpitations.   Gastrointestinal: Positive for abdominal pain. Negative for nausea and vomiting.   Neurological: Negative for dizziness and light-headedness.       Past Medical History:  Past Medical History:   Diagnosis Date    Anticoagulant long-term use     ASA daily, very low dose, 1/4 of 325mg    Anxiety     Arthritis     Bacteremia     with acute kidney failure    DDD (degenerative disc disease), lumbar     Depression     Son killed in [facility]    GERD (gastroesophageal reflux disease)     associated with ASA use    Hepatitis B     History of liver failure     Low back pain 5/2/2012    Lumbar vertebral fracture 1985     "Mobility impaired     Back pain and chest weakness, using walker    Nephrolithiasis     Neuralgia     Osteomyelitis     Abscess of Bilatteral Steroclavicular joints    Peptic ulcer disease        Objective:    Vitals:  Vitals:    06/03/22 1233   Weight: 104.3 kg (230 lb)   Height: 5' 11" (1.803 m)       Physical Exam  Constitutional:       General: He is not in acute distress.     Appearance: He is well-developed. He is not diaphoretic.   HENT:      Head: Normocephalic and atraumatic.   Pulmonary:      Effort: Pulmonary effort is normal. No respiratory distress.   Neurological:      Mental Status: He is alert and oriented to person, place, and time.   Psychiatric:         Behavior: Behavior normal.         Thought Content: Thought content normal.         Judgment: Judgment normal.         Data:  Chest CT  FINDINGS:  Lungs: There multiple scattered solid nodules present throughout the chest, most pronounced near the lung apices left greater than right.  The largest nodule in the right lung is solid and measures 10 mm in the right middle lobe on series 4 image 275, essentially unchanged when compared with the prior CT performed 01/26/2012.  The largest nodule in the left lung is solid and measures 6 mm in the left upper lobe on series 4 image 107.  There is centrilobular emphysematous lung architecture present.  No bronchiectasis.  No airspace consolidation.  There is a calcified granuloma present near the right lung apex on series 4, image 54.     Soft tissues of the base of the neck:Unremarkable.     Chest wall: There is a dual lead left chest cardiac pacemaker device in place.     Heart and great vessels: The heart is normal in size without a significant volume of pericardial fluid.  No thoracic aortic aneurysm. There are coronary artery calcifications present.     Lymph nodes: No pathologically enlarged lymph nodes in the chest or axilla.  There are calcified nonenlarged mediastinal and right hilar lymph nodes " present compatible with prior granulomatous disease.     Airways: Unremarkable.     Pleura: No significant volume of pleural fluid or pneumothorax.     Esophagus: No significant abnormalities appreciated.     Upper abdominal soft tissues: There is probable diffuse fatty infiltration of the liver.  There is an 11 mm short axis celiac lymph node present on series 2, image 120, unchanged when compared with the prior CT of the abdomen and pelvis performed 09/07/2014.     Bones: There is minor degenerative change of the thoracic spine.  No acute thoracic compression fracture.     Impression:     Lung-RADS Category:  3 - Probably Benign- 6 month LDCT.     Clinically or potentially clinically significant non lung cancer finding:  S - Significant.     Prior Lung Cancer Modifier:  No history of prior lung cancer.     Incidental findings:     1. Centrilobular emphysematous lung architecture.  2. Multiple scattered solid pulmonary nodules throughout the chest, most pronounced in the upper lobes.  3. Probable diffuse hepatic steatosis  4. Coronary artery calcifications  5. Evidence of prior granulomatous disease in the chest.    Ynes Calderon MD  Internal Medicine

## 2022-06-06 DIAGNOSIS — R06.00 DYSPNEA, UNSPECIFIED TYPE: ICD-10-CM

## 2022-06-06 NOTE — TELEPHONE ENCOUNTER
No new care gaps identified.  [facility] Embedded Care Gaps. Reference number: [id number]. 6/06/2022   4:36:22 PM CDT

## 2022-06-07 RX ORDER — FLUTICASONE FUROATE, UMECLIDINIUM BROMIDE AND VILANTEROL TRIFENATATE 100; 62.5; 25 UG/1; UG/1; UG/1
POWDER RESPIRATORY (INHALATION)
Qty: 60 EACH | Refills: 1 | Status: SHIPPED | OUTPATIENT
Start: 2022-06-07 | End: 2022-08-17 | Stop reason: SDUPTHER

## 2022-06-07 NOTE — TELEPHONE ENCOUNTER
Refill Routing Note   Medication(s) are not appropriate for processing by [facility] for the following reason(s):      - Medication is a new start (<3 months)    [facility] action(s):  Defer          Medication reconciliation completed: No     Appointments  past 12m or future 3m with PCP    Date Provider   Last Visit   6/3/2022 Ynes Calderon MD   Next Visit   9/9/2022 Ynes Calderon MD   ED visits in past 90 days: 1        Note composed:12:40 PM 06/07/2022

## 2022-06-08 DIAGNOSIS — F41.9 ANXIETY: ICD-10-CM

## 2022-06-08 DIAGNOSIS — E11.9 TYPE 2 DIABETES MELLITUS WITHOUT COMPLICATION, WITHOUT LONG-TERM CURRENT USE OF INSULIN: ICD-10-CM

## 2022-06-08 RX ORDER — METFORMIN HYDROCHLORIDE 500 MG/1
500 TABLET ORAL 2 TIMES DAILY WITH MEALS
Qty: 180 TABLET | Refills: 1 | Status: SHIPPED | OUTPATIENT
Start: 2022-06-08 | End: 2023-01-06

## 2022-06-08 RX ORDER — IPRATROPIUM BROMIDE AND ALBUTEROL SULFATE 2.5; .5 MG/3ML; MG/3ML
3 SOLUTION RESPIRATORY (INHALATION) EVERY 4 HOURS PRN
Qty: 75 ML | Refills: 5 | Status: SHIPPED | OUTPATIENT
Start: 2022-06-08 | End: 2022-06-09 | Stop reason: SDUPTHER

## 2022-06-08 RX ORDER — ALPRAZOLAM 1 MG/1
1 TABLET ORAL 2 TIMES DAILY PRN
Qty: 60 TABLET | Refills: 0 | OUTPATIENT
Start: 2022-06-08

## 2022-06-08 NOTE — TELEPHONE ENCOUNTER
No new care gaps identified.  [facility] Embedded Care Gaps. Reference number: [id number]. 6/08/2022   4:59:32 PM CDT

## 2022-06-08 NOTE — TELEPHONE ENCOUNTER
Metformin and duonebs sent. Alprazolam still not due until 6/22 and was [facility], so this refill was not sent again.

## 2022-06-08 NOTE — TELEPHONE ENCOUNTER
----- Message from Ju Dalal sent at 6/8/2022  2:15 PM CDT -----  Contact: Self  Patients Metformin was sent to the old [facility] by accident, can we please cancel that order and resend to his current pharmacy below. Also pt is looking to see where the Nebulizer machine order was sent to.       Type:  RX Refill Request    Who Called:  Patient  Refill or New Rx:  New Rx  RX Name and Strength: albuterol-ipratropium (DUO-NEB) 2.5 mg-0.5 mg/3 mL nebulizer solution, ALPRAZolam (XANAX) 1 MG tablet, metFORMIN (GLUCOPHAGE) 500 MG tablet  How is the patient currently taking it? (ex. 1XDay):  As directed  Is this a 30 day or 90 day RX:  30  Preferred Pharmacy with phone number:    [facility] [address]  [address] [place] [address]  Phone: [phone] Fax: [phone]  Local or Mail Order:  Local  Ordering Provider:  Dr. Yumiko Zavala Call Back Number:  [phone]  Additional Information:  Please call pt to advise. Thank You.

## 2022-06-09 RX ORDER — IPRATROPIUM BROMIDE AND ALBUTEROL SULFATE 2.5; .5 MG/3ML; MG/3ML
3 SOLUTION RESPIRATORY (INHALATION) EVERY 4 HOURS PRN
Qty: 90 ML | Refills: 5 | Status: SHIPPED | OUTPATIENT
Start: 2022-06-09 | End: 2024-03-13 | Stop reason: SDUPTHER

## 2022-06-09 NOTE — TELEPHONE ENCOUNTER
----- Message from Socorro Deras sent at 6/9/2022 12:39 PM CDT -----  Regarding: Call back  Contact: [phone]  Type:  Patient Call Back    Who Called:[facility]  What this is regarding?:rx only comes in 90ml needs new rx sent over albuterol-ipratropium (DUO-NEB) 2.5 mg-0.5 mg/3 mL nebulizer solution 75 mL   Would the patient rather a call back or a response via [facility]? Call back  Best Call Back Number:  [facility] - [place], [place] - [address] [facility] [address]  [address] [address]  Phone: [phone] Fax: [phone]    Additional Information:     Advised to call back directly if there are further questions, or if these symptoms fail to improve as anticipated or worsen.

## 2022-06-09 NOTE — TELEPHONE ENCOUNTER
No new care gaps identified.  [facility] Embedded Care Gaps. Reference number: [id number]. 6/09/2022   2:18:15 PM CDT

## 2022-06-13 ENCOUNTER — PATIENT OUTREACH (OUTPATIENT)
Dept: ADMINISTRATIVE | Facility: HOSPITAL | Age: 62
End: 2022-06-13
Payer: MEDICARE

## 2022-06-13 ENCOUNTER — PATIENT MESSAGE (OUTPATIENT)
Dept: ADMINISTRATIVE | Facility: HOSPITAL | Age: 62
End: 2022-06-13
Payer: MEDICARE

## 2022-06-13 NOTE — PROGRESS NOTES
2022 Care Everywhere updates requested and reviewed.  Immunizations reconciled. Media reports reviewed.  Duplicate HM overrides and  orders removed.  Overdue HM topic chart audit and/or requested.  Overdue lab testing linked to upcoming lab appointments if applies.    Health Maintenance Due   Topic Date Due    TETANUS VACCINE  Never done    Colorectal Cancer Screening  Never done    Shingles Vaccine (1 of 2) Never done    COVID-19 Vaccine (3 - Booster for Pfizer series) 2021    PROSTATE-SPECIFIC ANTIGEN  2022

## 2022-06-17 ENCOUNTER — PATIENT OUTREACH (OUTPATIENT)
Dept: ADMINISTRATIVE | Facility: HOSPITAL | Age: 62
End: 2022-06-17
Payer: MEDICARE

## 2022-06-17 NOTE — PROGRESS NOTES
FITKIT DISPENSED NOT RETURNED    Pt did not receive fitkit    Address verified    Added to sent out list

## 2022-06-21 ENCOUNTER — TELEPHONE (OUTPATIENT)
Dept: ADMINISTRATIVE | Facility: HOSPITAL | Age: 62
End: 2022-06-21
Payer: MEDICARE

## 2022-06-24 ENCOUNTER — TELEPHONE (OUTPATIENT)
Dept: HEPATOLOGY | Facility: CLINIC | Age: 62
End: 2022-06-24
Payer: MEDICARE

## 2022-06-24 NOTE — TELEPHONE ENCOUNTER
Dr. Ynes Calderon ordered that patient be scheduled for a hepatology consult visit.  Several attempts made to reach patient by phone to setup consult visit with PA Scheuermann.  I spoke with him twice but was told each time that he was driving and to call him back.   Letter sent 6/15/22 and 6/24/22 asking that he call hepatology for scheduling.

## 2022-06-28 ENCOUNTER — OFFICE VISIT (OUTPATIENT)
Dept: HEPATOLOGY | Facility: CLINIC | Age: 62
End: 2022-06-28
Payer: MEDICARE

## 2022-06-28 DIAGNOSIS — B18.2 CHRONIC HEPATITIS C WITHOUT HEPATIC COMA: Primary | ICD-10-CM

## 2022-06-28 PROCEDURE — 1160F PR REVIEW ALL MEDS BY PRESCRIBER/CLIN PHARMACIST DOCUMENTED: ICD-10-PCS | Mod: CPTII,95,, | Performed by: PHYSICIAN ASSISTANT

## 2022-06-28 PROCEDURE — 99204 OFFICE O/P NEW MOD 45 MIN: CPT | Mod: 95,,, | Performed by: PHYSICIAN ASSISTANT

## 2022-06-28 PROCEDURE — 1159F MED LIST DOCD IN RCRD: CPT | Mod: CPTII,95,, | Performed by: PHYSICIAN ASSISTANT

## 2022-06-28 PROCEDURE — 1160F RVW MEDS BY RX/DR IN RCRD: CPT | Mod: CPTII,95,, | Performed by: PHYSICIAN ASSISTANT

## 2022-06-28 PROCEDURE — 3044F HG A1C LEVEL LT 7.0%: CPT | Mod: CPTII,95,, | Performed by: PHYSICIAN ASSISTANT

## 2022-06-28 PROCEDURE — 3044F PR MOST RECENT HEMOGLOBIN A1C LEVEL <7.0%: ICD-10-PCS | Mod: CPTII,95,, | Performed by: PHYSICIAN ASSISTANT

## 2022-06-28 PROCEDURE — 1159F PR MEDICATION LIST DOCUMENTED IN MEDICAL RECORD: ICD-10-PCS | Mod: CPTII,95,, | Performed by: PHYSICIAN ASSISTANT

## 2022-06-28 PROCEDURE — 99204 PR OFFICE/OUTPT VISIT, NEW, LEVL IV, 45-59 MIN: ICD-10-PCS | Mod: 95,,, | Performed by: PHYSICIAN ASSISTANT

## 2022-06-28 NOTE — PROGRESS NOTES
HEPATOLOGY VIDEO VISIT NOTE - HCV clinic  The patient location is: [place]  Visit type: audiovisual   (converted to audio only due to technical issues)    Each patient to whom he or she provides medical services by telemedicine is:  (1) informed of the relationship between the physician and patient and the respective role of any other health care provider with respect to management of the patient; and (2) notified that he or she may decline to receive medical services by telemedicine and may withdraw from such care at any time.      REFERRING PROVIDER: Ynes Calderon MD  CHIEF COMPLAINT: Hepatitis C       HISTORY       This is a 61 y.o. White male with chronic hepatitis C, being seen for further eval / mngmt.     He recalls icteric illness due to HBV in 2015 (presumably acquired from a roommate w/ HBV) and ultimately was told he cleared on his own. Per chart review he was referred to Dr Dempsey in 2015 for HBV but not seen. Labs earlier this year revealed neg HBsAg.     HCV history:  Diagnosed this year  Risks for HCV:  Blood transfusion - none before 1992    IVDA / Intranasal drug use - no    Tattoos - no  - Treatment naive  - Genotype ?  - HCV RNA 1.5 mill IU/mL - 5/27/22    Liver staging:  No formal liver staging    Labs and imaging reveal no obvious evidence of advanced fibrosis   U/S 5/2022 - liver w/ increased echogenicity, spleen not assessed   Labs - well preserved liver function     Denies jaundice, dark urine, abdominal distention, hematemesis, melena, slowed mentation.     PMH, PSH, SOCIAL HX, FAMILY HX      Reviewed in Epic  Pertinent findings:  FAMILY HX: uncles w/ alcohol related liver disease  SOCIAL HX: resides in [place]  Alcohol - none  Drugs - none      ROS:   Review of Systems   Constitutional: Positive for malaise/fatigue.   Cardiovascular: Negative for chest pain.   Gastrointestinal: Positive for abdominal pain (Right sided).   Musculoskeletal: Positive for back pain.       PHYSICAL EXAM:   Friendly White male, in no acute distress; alert and oriented to person, place and time  LUNGS: Normal respiratory effort.  NEURO/PSYCH: Memory intact. Thought and speech pattern appropriate. Behavior normal. No depression or anxiety noted.      PERTINENT DIAGNOSTIC RESULTS      Lab Results   Component Value Date    WBC 10.40 05/18/2022    HGB 15.2 05/18/2022     (L) 05/18/2022     No results found for: INR  Lab Results   Component Value Date    AST 57 05/18/2022    ALT 40 05/18/2022    BILITOT 1.2 05/18/2022    ALBUMIN 4.1 05/18/2022    ALKPHOS 74 05/18/2022    CREATININE 0.62 05/18/2022    BUN 11 05/18/2022     05/18/2022    K 4.9 05/18/2022       US ABDOMEN LIMITED - 5/18/22   CLINICAL HISTORY:RUQ abdominal pain;   TECHNIQUE:Limited ultrasound of the right upper quadrant of the abdomen (including pancreas, liver, gallbladder, common bile duct, and spleen) was performed.   COMPARISON:Right upper quadrant ultrasound 09/17/2014     FINDINGS:  Pancreas: The pancreas was not visualized due to significant amount of bowel gas in the epigastric region.     Liver: The liver measures approximately 22.1 cm in its craniocaudal dimension.  Increased echotexture to the liver especially in relationship to the renal parenchyma suggestive of hepatic steatosis.  No gross focal hepatic masses or intrahepatic biliary ductal dilatation.  There is hepatopetal portal venous flow with the portal vein velocity of 13 centimeters/second.     Gallbladder: There is an anechoic gallbladder without definite signs for cholelithiasis or cholecystitis or abnormal pericholecystic fluid collections.  The extrahepatic common bile duct measures 4.7 mm and is within normal limits.  Negative Finley sign.     Right kidney: Right kidney measures 13.1 x 6.3 x 5.9 cm.  The there is a approximately 0.6 x 0.5 x 0.4 cm echogenic focus in the midpole of the right kidney without significant shadowing.  It likely represents a nonobstructing renal  calculus or could represent a tiny angiomyolipoma.     Impression:     1. Hepatomegaly with findings of hepatic steatosis.  2. A approximately 6 mm maximum diameter echogenic focus in the midpole of the right kidney likely representing a nonshadowing calculus or a small angiomyolipoma.  3. Pancreas not evaluate due to bowel gas.    ASSESSMENT        61 y.o. White male with:  1. CHRONIC HEPATITIS C, GENOTYPE ? - treatment naive  -- Unknown Immunity to HAV     2. PRIOR HBV INFECTION    3. PACEMAKER PRESENT   -- precludes fibroscan    PLAN        1. Labs   2. U/S elastography & visit on same day as long as:  - able to arrange transportation to [place]   - mobility issues won't preclude appts.  3. Goal of antiviral therapy    Orders Placed This Encounter   Procedures    US Elastography Liver    HCV Fibrosure    Hepatitis C Genotype    Hepatitis B Surface Ab, Qualitative    Hepatitis B Core Antibody, Total    Hepatitis A antibody, IgG       ___________________________________________________________________  EDUCATION:  The natural history of Hepatitis C, including potential progression to cirrhosis was reviewed. We discussed the increased progression of liver disease secondary to alcohol use; patient was advised to avoid alcohol completely.     Transmission of Hepatitis C was reviewed, including possible sexual transmission. Sexual contacts should be screened.   Patient should avoid sharing personal products such as razors, toothbrushes, etc.     Recommend avoiding raw seafood.  Limit acetaminophen to 2000mg daily.  __________________________________________________________________    Duration of encounter: 46 min  This includes face-to-face time and non face-to-face time preparing to see the patient (eg, review of tests), obtaining and/or reviewing separately obtained history, documenting clinical information in the electronic or other health record, independently interpreting resultsand communicating results  to the patient/family/caregiver, or care coordination.

## 2022-06-28 NOTE — Clinical Note
Pls schedule: 1. Labs some time prior to visit (would like results avail at time of visit)  2. U/S elastography if he can arrange transportation to [place] (he's checking on this) and if he thinks he can get back and forth b/w radiology and clinic building

## 2022-06-29 ENCOUNTER — TELEPHONE (OUTPATIENT)
Dept: HEPATOLOGY | Facility: CLINIC | Age: 62
End: 2022-06-29
Payer: MEDICARE

## 2022-06-29 NOTE — TELEPHONE ENCOUNTER
I spoke with patient and msg from PA Scheuermann relayed.  He states that it would be very difficult for him to go from imaging building to clinic building because of his mobility.  He would need to be moved from one location to another with a medical van.  He's not certain if his insurance company will provide transportation to [facility].  Labs linked to already scheduled blood draw on 6/30/22.  F/u appt with PA Scheuermann scheduled 8/1/22.  Patient states he will call back for US elasto scheduling  if insurance company provides transportation to imaging building at [facility].

## 2022-06-29 NOTE — TELEPHONE ENCOUNTER
----- Message from Jennifer B. Scheuermann, PA sent at 6/28/2022  1:40 PM CDT -----  Pls schedule: 1. Labs some time prior to visit (would like results avail at time of visit)  2. U/S elastography if he can arrange transportation to [place] (he's checking on this) and if he thinks he can get back and forth b/w radiology and clinic building

## 2022-07-20 ENCOUNTER — HOSPITAL ENCOUNTER (OUTPATIENT)
Dept: RADIOLOGY | Facility: HOSPITAL | Age: 62
Discharge: HOME | End: 2022-07-20
Attending: INTERNAL MEDICINE
Payer: MEDICARE

## 2022-07-20 DIAGNOSIS — R91.8 MULTIPLE PULMONARY NODULES: ICD-10-CM

## 2022-07-20 DIAGNOSIS — J44.9 CHRONIC OBSTRUCTIVE PULMONARY DISEASE, UNSPECIFIED COPD TYPE: ICD-10-CM

## 2022-07-20 PROCEDURE — 71250 CT THORAX DX C-: CPT | Mod: TC,PO

## 2022-07-20 PROCEDURE — 71250 CT THORAX DX C-: CPT | Mod: 26,,, | Performed by: RADIOLOGY

## 2022-07-20 PROCEDURE — 71250 CT CHEST WITHOUT CONTRAST: ICD-10-PCS | Mod: 26,,, | Performed by: RADIOLOGY

## 2022-08-01 ENCOUNTER — OFFICE VISIT (OUTPATIENT)
Dept: HEPATOLOGY | Facility: CLINIC | Age: 62
End: 2022-08-01
Payer: MEDICARE

## 2022-08-01 ENCOUNTER — PATIENT MESSAGE (OUTPATIENT)
Dept: HEPATOLOGY | Facility: CLINIC | Age: 62
End: 2022-08-01

## 2022-08-01 DIAGNOSIS — B18.2 CHRONIC HEPATITIS C WITHOUT HEPATIC COMA: Primary | ICD-10-CM

## 2022-08-01 PROCEDURE — 99213 OFFICE O/P EST LOW 20 MIN: CPT | Mod: 95,,, | Performed by: PHYSICIAN ASSISTANT

## 2022-08-01 PROCEDURE — 1160F PR REVIEW ALL MEDS BY PRESCRIBER/CLIN PHARMACIST DOCUMENTED: ICD-10-PCS | Mod: CPTII,95,, | Performed by: PHYSICIAN ASSISTANT

## 2022-08-01 PROCEDURE — 99213 PR OFFICE/OUTPT VISIT, EST, LEVL III, 20-29 MIN: ICD-10-PCS | Mod: 95,,, | Performed by: PHYSICIAN ASSISTANT

## 2022-08-01 PROCEDURE — 3051F HG A1C>EQUAL 7.0%<8.0%: CPT | Mod: CPTII,95,, | Performed by: PHYSICIAN ASSISTANT

## 2022-08-01 PROCEDURE — 3051F PR MOST RECENT HEMOGLOBIN A1C LEVEL 7.0 - < 8.0%: ICD-10-PCS | Mod: CPTII,95,, | Performed by: PHYSICIAN ASSISTANT

## 2022-08-01 PROCEDURE — 1159F MED LIST DOCD IN RCRD: CPT | Mod: CPTII,95,, | Performed by: PHYSICIAN ASSISTANT

## 2022-08-01 PROCEDURE — 1160F RVW MEDS BY RX/DR IN RCRD: CPT | Mod: CPTII,95,, | Performed by: PHYSICIAN ASSISTANT

## 2022-08-01 PROCEDURE — 1159F PR MEDICATION LIST DOCUMENTED IN MEDICAL RECORD: ICD-10-PCS | Mod: CPTII,95,, | Performed by: PHYSICIAN ASSISTANT

## 2022-08-01 RX ORDER — VELPATASVIR AND SOFOSBUVIR 100; 400 MG/1; MG/1
1 TABLET, FILM COATED ORAL DAILY
Qty: 28 TABLET | Refills: 2 | Status: SHIPPED | OUTPATIENT
Start: 2022-08-01 | End: 2024-03-13 | Stop reason: ALTCHOICE

## 2022-08-01 NOTE — PROGRESS NOTES
HEPATOLOGY VIDEO VISIT NOTE - HCV clinic  The patient location is: [place]  Visit type: audiovisual   (converted to audio only due to technical issues)    Each patient to whom he or she provides medical services by telemedicine is:  (1) informed of the relationship between the physician and patient and the respective role of any other health care provider with respect to management of the patient; and (2) notified that he or she may decline to receive medical services by telemedicine and may withdraw from such care at any time.    CHIEF COMPLAINT: Hepatitis C       HISTORY       This is a 61 y.o. White male with chronic hepatitis C    Being seen for f/u w/ additional labs, imaging, liver staging.   Prior resolved HBV infection   Lacking immunity to HAV   No apparent evidence of advanced fibrosis    Feels well  Motivated to have HCV treated  Denies jaundice, dark urine, hematemesis, melena, slowed mentation, abdominal distention.       HCV history:  Diagnosed this year  Risks for HCV:  Blood transfusion - none before 1992    IVDA / Intranasal drug use - no    Tattoos - no  - Treatment naive  - Genotype 1a    Liver staging:  FibroSURE 7/20/22 - A1, F2  Transportation issues & pacemaker preclude both elastography and fibroscan  Labs 7/2022  FIB-4 -- 0.44; Cirrhosis less likely  APRI -- 1.64; Indeterminate    Labs and imaging reveal no obvious evidence of advanced fibrosis   U/S 5/2022 - liver w/ increased echogenicity, spleen not assessed   Labs - well preserved liver function         PMH, PSH, SOCIAL HX, FAMILY HX      Reviewed in Epic  Pertinent findings:  FAMILY HX: uncles w/ alcohol related liver disease  SOCIAL HX: resides in [place]  Alcohol - none  Drugs - none      ROS:   Review of Systems   Constitutional: Positive for malaise/fatigue.   Respiratory: Positive for shortness of breath.    Cardiovascular: Negative for chest pain.   Gastrointestinal: Negative for heartburn.   Psychiatric/Behavioral:  Negative for memory loss.       PHYSICAL EXAM:  Friendly White male, in no acute distress; alert and oriented to person, place and time  LUNGS: Normal respiratory effort.  NEURO/PSYCH: Memory intact. Thought and speech pattern appropriate. Behavior normal. No depression or anxiety noted.      PERTINENT DIAGNOSTIC RESULTS      Lab Results   Component Value Date    WBC 11.95 07/20/2022    HGB 15.0 07/20/2022     07/20/2022     No results found for: INR  Lab Results   Component Value Date    AST 30 07/20/2022    ALT 41 07/20/2022    BILITOT 0.4 07/20/2022    ALBUMIN 3.5 07/20/2022    ALKPHOS 78 07/20/2022    CREATININE 0.8 07/20/2022    BUN 10 07/20/2022     07/20/2022    K 3.9 07/20/2022       US ABDOMEN LIMITED - 5/18/22   CLINICAL HISTORY:RUQ abdominal pain;   TECHNIQUE:Limited ultrasound of the right upper quadrant of the abdomen (including pancreas, liver, gallbladder, common bile duct, and spleen) was performed.   COMPARISON:Right upper quadrant ultrasound 09/17/2014     FINDINGS:  Pancreas: The pancreas was not visualized due to significant amount of bowel gas in the epigastric region.     Liver: The liver measures approximately 22.1 cm in its craniocaudal dimension.  Increased echotexture to the liver especially in relationship to the renal parenchyma suggestive of hepatic steatosis.  No gross focal hepatic masses or intrahepatic biliary ductal dilatation.  There is hepatopetal portal venous flow with the portal vein velocity of 13 centimeters/second.     Gallbladder: There is an anechoic gallbladder without definite signs for cholelithiasis or cholecystitis or abnormal pericholecystic fluid collections.  The extrahepatic common bile duct measures 4.7 mm and is within normal limits.  Negative Finley sign.     Right kidney: Right kidney measures 13.1 x 6.3 x 5.9 cm.  The there is a approximately 0.6 x 0.5 x 0.4 cm echogenic focus in the midpole of the right kidney without significant shadowing.  It  likely represents a nonobstructing renal calculus or could represent a tiny angiomyolipoma.     Impression:     1. Hepatomegaly with findings of hepatic steatosis.  2. A approximately 6 mm maximum diameter echogenic focus in the midpole of the right kidney likely representing a nonshadowing calculus or a small angiomyolipoma.  3. Pancreas not evaluate due to bowel gas.    ASSESSMENT        61 y.o. White male with:  1. CHRONIC HEPATITIS C, GENOTYPE 1a - treatment naive  -- FibroSURE F2  -- lacking Immunity to HAV   -- prior resolved HBV        PLAN      1. HAV vaccine - msg sent to PCP  2. Obtain authorization to treat HCV with Epclusa x 12 weeks  -- Rx will be routed to [facility]  -- Patient will notify me of exact treatment start date so appropriate lab f/u can be scheduled.      ______________________________________________________________________________  EDUCATION:  HCV RX  Discussed goal of HCV eradication to prevent progression of liver disease.  Discussed use of Epclusa daily x 12 weeks w/ potential side effects of fatigue and headache.     Reviewed limitations on acid suppressant medications due to DDI w/ Epclusa:  -- Antacids, H2 Receptor Antagonist, PPI - Pt not taking  Patient instructed to contact me if experiencing acid related symptoms so further recommendations can be made regarding acid suppression therapy.      Herbal / alternative therapies must be discontinued  Discussed importance of medication adherence and risk of treatment failure / viral resistance if not adherent. Pt has verbalized understanding.    __________________________________________________________________    Duration of encounter: 28 min  This includes face-to-face time and non face-to-face time preparing to see the patient (eg, review of tests), obtaining and/or reviewing separately obtained history, documenting clinical information in the electronic or other health record, independently interpreting resultsand  communicating results to the patient/family/caregiver, or care coordination.

## 2022-08-01 NOTE — Clinical Note
Hi Dr Calderon - any chance you can schdule this pt for the HAV vaccine? I'm not able to schedule it in [place]. Thanks! Jen Scheuermann, PA-C Hepatology - HCV Clinic

## 2022-08-02 ENCOUNTER — PATIENT MESSAGE (OUTPATIENT)
Dept: FAMILY MEDICINE | Facility: CLINIC | Age: 62
End: 2022-08-02
Payer: MEDICARE

## 2022-08-02 ENCOUNTER — TELEPHONE (OUTPATIENT)
Dept: FAMILY MEDICINE | Facility: CLINIC | Age: 62
End: 2022-08-02
Payer: MEDICARE

## 2022-08-02 DIAGNOSIS — B18.2 CHRONIC HEPATITIS C WITHOUT HEPATIC COMA: ICD-10-CM

## 2022-08-02 DIAGNOSIS — Z23 NEED FOR VACCINATION: Primary | ICD-10-CM

## 2022-08-02 NOTE — TELEPHONE ENCOUNTER
Attempted to contact pt. No answer, left message for pt to call clinic back to r/s hep a vaccine that was made for him if needed      Portal message sent as well

## 2022-08-02 NOTE — TELEPHONE ENCOUNTER
----- Message from Jennifer B. Scheuermann, PA sent at 8/1/2022  9:25 AM CDT -----  Hi Dr Calderon - any chance you can schdule this pt for the HAV vaccine? I'm not able to schedule it in [place]. Thanks!  Jen Scheuermann, PA-C  Hepatology - HCV Clinic

## 2022-08-03 DIAGNOSIS — E11.9 TYPE 2 DIABETES MELLITUS WITHOUT COMPLICATION, UNSPECIFIED WHETHER LONG TERM INSULIN USE: ICD-10-CM

## 2022-08-04 ENCOUNTER — SPECIALTY PHARMACY (OUTPATIENT)
Dept: PHARMACY | Facility: CLINIC | Age: 62
End: 2022-08-04
Payer: MEDICARE

## 2022-08-04 DIAGNOSIS — B18.2 CHRONIC HEPATITIS C WITHOUT HEPATIC COMA: Primary | ICD-10-CM

## 2022-08-04 NOTE — TELEPHONE ENCOUNTER
AG Epclusa test claim - PA required.   BRAND Epclusa test claim (DAW9) - PA required.     Seems there is no plan preference. Will proceed with Code Blue Epclusa PA.     PA submitted via [employer] integration portal at 11:42 AM ([id number]).

## 2022-08-05 NOTE — TELEPHONE ENCOUNTER
ROSITA CHOE approved. Request Reference Number: [id number]. SOFOS/VELPAT -100 is approved through 10/27/2022.     AG Epclusa test claim - $0 copay. Benefits investigation not required (Medicare D - [employer] - LIS LVL 1).

## 2022-08-08 ENCOUNTER — PATIENT MESSAGE (OUTPATIENT)
Dept: ADMINISTRATIVE | Facility: HOSPITAL | Age: 62
End: 2022-08-08
Payer: MEDICARE

## 2022-08-09 ENCOUNTER — SPECIALTY PHARMACY (OUTPATIENT)
Dept: PHARMACY | Facility: CLINIC | Age: 62
End: 2022-08-09
Payer: MEDICARE

## 2022-08-09 RX ORDER — CLONAZEPAM 0.5 MG/1
0.5 TABLET ORAL 3 TIMES DAILY PRN
COMMUNITY

## 2022-08-09 NOTE — TELEPHONE ENCOUNTER
Specialty Pharmacy - Initial Clinical Assessment    Specialty Medication Orders Linked to Encounter    Flowsheet Row Most Recent Value   Medication #1 sofosbuvir-velpatasvir 400-100 mg Tab (Order#[id number], Rx#[id number])        Patient Diagnosis   B18.2 - Chronic hepatitis C without hepatic coma    Subjective    Cholo Nogueira is a 61 y.o. male, who is followed by the specialty pharmacy service for management and education.    Recent Encounters     Date Type Provider Description    08/09/2022 Specialty Pharmacy Silvana Trotter PharmD Initial Clinical Assessment    08/04/2022 Specialty Pharmacy Katlin Otto PharmD Referral Authorization        Clinical call attempts since last clinical assessment   8/8/2022  7:49 PM - Specialty Pharmacy - Clinical Assessment by Ced Harman PharmD     Current Outpatient Medications   Medication Sig    albuterol-ipratropium (DUO-NEB) 2.5 mg-0.5 mg/3 mL nebulizer solution Take 3 mLs by nebulization every 4 (four) hours as needed for Shortness of Breath. Rescue    ascorbic acid, vitamin C, (VITAMIN C) 500 MG tablet Take 1,000 mg by mouth once daily.     aspirin (ECOTRIN) 81 MG EC tablet Take 81 mg by mouth.    b complex vitamins capsule Take 1 capsule by mouth once daily.    buprenorphine-naloxone (SUBOXONE) 8-2 mg Film Place 3 packets (3 each total) under the tongue once daily. Take 2.5 Films per day (Patient taking differently: Place 1 each under the tongue 3 (three) times daily.)    clonazePAM (KLONOPIN) 0.5 MG tablet Take 0.5 mg by mouth 3 (three) times daily as needed.    gabapentin (NEURONTIN) 800 MG tablet TAKE 1 TABLET BY MOUTH THREE TIMES A DAY.    metFORMIN (GLUCOPHAGE) 500 MG tablet Take 1 tablet (500 mg total) by mouth 2 (two) times daily with meals.    multivitamin (THERAGRAN) per tablet Take 1 tablet by mouth once daily.    ondansetron (ZOFRAN-ODT) 4 MG TbDL Take 1 tablet (4 mg total) by mouth every 6 (six) hours as needed.    sofosbuvir-velpatasvir  400-100 mg Tab Take 1 tablet by mouth once daily.    TRELEGY ELLIPTA 100-62.5-25 mcg DsDv INHALE 1 PUFF INTO THE LUNGS ONCE DAILY.   Last reviewed on 8/9/2022 10:09 AM by Ktalin Otto, PharmD    Review of patient's allergies indicates:   Allergen Reactions    Ms contin [morphine] Nausea Only    Soma [carisoprodol] Nausea And Vomiting    Tylenol [acetaminophen]      Liver damage    Ultram [tramadol] Nausea Only    Aspirin Other (See Comments)     CAN take low dose. Gets stomach ulcers and indigestion    Penicillins Rash   Last reviewed on  8/9/2022 10:10 AM by Katlin Otto    Drug Interactions    Drug interactions evaluated: yes  Clinically relevant drug interactions identified: no  Provided the patient with educational material regarding drug interactions: not applicable         Adverse Effects    *All other systems reviewed and are negative       Assessment Questions - Documented Responses    Flowsheet Row Most Recent Value   Assessment    Medication Reconciliation completed for patient Yes   During the past 4 weeks, has patient missed any activities due to condition or medication? No   During the past 4 weeks, did patient have any of the following urgent care visits? None   Goals of Therapy Status Discussed (new start)   Status of the patients ability to self-administer: Is Able   All education points have been covered with patient? Yes, supplemental printed education provided   Welcome packet contents reviewed and discussed with patient? Yes   Assesment completed? Yes   Plan Therapy being initiated   Do you need to open a clinical intervention (i-vent)? No   Do you want to schedule first shipment? Yes        Refill Questions - Documented Responses    Flowsheet Row Most Recent Value   Patient Availability and HIPAA Verification    Does patient want to proceed with activity? Yes   HIPAA/medical authority confirmed? Yes   Relationship to patient of person spoken to? Self   Refill Screening Questions   "  When does the patient need to receive the medication? 08/10/22   Refill Delivery Questions    How will the patient receive the medication? Delivery Leti   When does the patient need to receive the medication? 08/10/22   Shipping Address Home   Address in [facility] confirmed and updated if neccessary? Yes   Expected Copay ($) 0   Is the patient able to afford the medication copay? Yes   Payment Method zero copay   Days supply of Refill 28   Supplies needed? No supplies needed   Refill activity completed? Yes   Refill activity plan Refill scheduled   Shipment/Pickup Date: 08/09/22  [Delivery 8/10/22]          Objective    He has a past medical history of Anticoagulant long-term use, Anxiety, Arthritis, Bacteremia, Chronic hepatitis C, DDD (degenerative disc disease), lumbar, Depression, GERD (gastroesophageal reflux disease), Hepatitis B, History of liver failure, Low back pain (05/02/2012), Lumbar vertebral fracture (1985), Mobility impaired, Nephrolithiasis, Neuralgia, Osteomyelitis, and Peptic ulcer disease.    Tried/failed medications: NONE    BP Readings from Last 4 Encounters:   05/18/22 (!) 141/87   05/11/22 138/80   01/05/22 136/84   03/02/21 122/80     Ht Readings from Last 4 Encounters:   06/03/22 5' 11" (1.803 m)   05/18/22 5' 11" (1.803 m)   05/11/22 5' 11" (1.803 m)   01/05/22 5' 11" (1.803 m)     Wt Readings from Last 4 Encounters:   06/03/22 104.3 kg (230 lb)   05/18/22 104.3 kg (230 lb)   05/11/22 107.1 kg (236 lb)   01/05/22 120.1 kg (264 lb 14.1 oz)     No results found for: HCVGENOTYPE  Recent Labs   Lab Result Units 07/20/22  1316 05/18/22  0820 05/11/22  1525   Creatinine mg/dL 0.8 0.62 0.8   ALT U/L 41 40 40   AST U/L 30 57 33   Hep B S Ab  Positive  --   --    Hep B Core Total Ab  Positive A  --   --      The goals of Hepatitis C Virus (HCV) infection treatment include:  · Reducing all-cause mortality and liver-related health adverse consequences, including cirrhosis, end-stage liver " disease, and hepatocellular carcinoma  · Achieving virologic cure as evidenced by a sustained virologic response  · Improving or maintaining quality of life  · Maintaining optimal therapy adherence  · Minimizing and managing side effects  · Preventing the transmission of HCV      Goals of Therapy Status: Discussed (new start)    Assessment/Plan  Patient plans to start therapy on 08/10/22      Indication, dosage, appropriateness, effectiveness, safety and convenience of his specialty medication(s) were reviewed today.     Patient Education   Patient received education on the following:    Expectations and possible outcomes of therapy   Proper use, timely administration, and missed dose management   Duration of therapy   Side effects, including prevention, minimization, and management   Contraindications and safety precautions   New or changed medications, including prescribe and over the counter medications and supplements   Reviews recommended vaccinations, as appropriate   Storage, safe handling, and disposal    AG Epclusa 400/100mg - Take one tablet by mouth daily x 12 weeks.   Counseling was reviewed:  1. Patient MUST take Epclusa at the SAME time every day.  2. Patient MUST avoid acid reducers without consulting with myself or provider first. Patient doesn't experience heartburn or use acid suppressants.   3. Potential Side effects include: headaches and fatigue. Headache: Patient may treat with OTC remedies. If Tylenol is used, dose should not exceed 2000mg per day.   4. Allergies reviewed and medication reconciliation complete (reviewed and documented in [facility] and [facility]). Patient MUST contact myself or provider prior to starting any new OTC, herbal, or prescription drugs to avoid potential DDIs.    DDI: Medication list reviewed and potential DDIs addressed.    Patient plans to start Epclusa on 8/10/22.      Clinical Review:  Diagnosis: Chronic hepatitis C without hepatic coma  [B18.2]  Weeks: 12 weeks   Genotype: 1a  HCV RNA: 752, 844 IU/mL (8/20/22)  Fibrosis: F2  CP: A   Renal: eGFR >60 mL/min   Treatment: NAIVE  HBV: HBsAB (-), HBcAB (+), HBsAg (-)   Appropriate based on AASLD guidelines: Appropriate.    Tasks added this encounter   8/31/2022 - Refill Call (Auto Added)  8/17/2022 - 7 Day Post Start Touchbase   Tasks due within next 3 months   No tasks due.     Katlin Otto, PharmD  [facility]  [address] [facility] [address]  Phone: [phone]  Fax: [phone]

## 2022-08-11 ENCOUNTER — TELEPHONE (OUTPATIENT)
Dept: HEPATOLOGY | Facility: CLINIC | Age: 62
End: 2022-08-11
Payer: MEDICARE

## 2022-08-11 DIAGNOSIS — B18.2 CHRONIC HEPATITIS C WITHOUT HEPATIC COMA: Primary | ICD-10-CM

## 2022-08-11 NOTE — TELEPHONE ENCOUNTER
Pt beginning 12 weeks of Epclusa on 8/10/22  Anticipated treatment end date: 11/2/22  [place] 1a  Treatment naive  F2    Pls update episode and schedule:  - LFT, HCV RNA at week 6 -   - LFT, HCV RNA - SVR12 - 2/2/23

## 2022-08-12 NOTE — TELEPHONE ENCOUNTER
Msg from provider mailed to patient.  Labs scheduled 9/20/22 and 2/2/23; appt reminder notices mailed.

## 2022-08-17 ENCOUNTER — SPECIALTY PHARMACY (OUTPATIENT)
Dept: PHARMACY | Facility: CLINIC | Age: 62
End: 2022-08-17
Payer: MEDICARE

## 2022-08-17 DIAGNOSIS — R06.00 DYSPNEA, UNSPECIFIED TYPE: ICD-10-CM

## 2022-08-17 NOTE — TELEPHONE ENCOUNTER
----- Message from Kendal Steiner sent at 8/17/2022 11:29 AM CDT -----  Contact: pt  Type:  RX Refill Request    Who Called:  pt  Refill or New Rx:  refill  RX Name and Strength:  TRELEGY ELLIPTA 100-62.5-25 mcg DsDv  How is the patient currently taking it? (ex. 1XDay):  As Directed  Is this a 30 day or 90 day RX:  30  Preferred Pharmacy with phone number:  [facility]  Local or Mail Order:  local  Ordering Provider:  Yumiko Zavala Call Back Number:  [phone]    Additional Information:  Please contact pt upon completion-Thank you~

## 2022-08-17 NOTE — TELEPHONE ENCOUNTER
No new care gaps identified.  [facility] Embedded Care Gaps. Reference number: [id number]. 8/17/2022   2:50:25 PM CDT

## 2022-08-17 NOTE — TELEPHONE ENCOUNTER
7 Day Touchbase - Documented Responses    Flowsheet Row Most Recent Value   Have you started taking your medication? Yes   What day did you start? 08/10/22   How are you feeling?  Patien is feeling well overall. He does admit to some mild fatigue. This is a potential side effect of AG Epclusa. Patient advised to ensure adequate water intake to help with fatigue. Fatigue usually subsides after the first few weeks of treatment.   How are you taking your medication? Are you having any problems taking your medication? Patient is taking AG Epclusa 1 tablet daily in the AM. He confirms he has NOT missed any doses.   Do you have any questions or concerns that we can help you with today? No.        Patient has not started any new meds and has not needed any acid suppression. He knows to call [facility] if heartburn medications are needed.     Katlin Otto, PharmD  [facility]  [address]  [facility] [address]  Phone: [phone]  Fax: [phone]

## 2022-08-24 ENCOUNTER — PATIENT MESSAGE (OUTPATIENT)
Dept: ADMINISTRATIVE | Facility: HOSPITAL | Age: 62
End: 2022-08-24
Payer: MEDICARE

## 2022-08-31 ENCOUNTER — SPECIALTY PHARMACY (OUTPATIENT)
Dept: PHARMACY | Facility: CLINIC | Age: 62
End: 2022-08-31
Payer: MEDICARE

## 2022-08-31 NOTE — TELEPHONE ENCOUNTER
Specialty Pharmacy - Refill Coordination (AG EPCLUSA REFILL 2 of 3)    Specialty Medication Orders Linked to Encounter      Flowsheet Row Most Recent Value   Medication #1 sofosbuvir-velpatasvir 400-100 mg Tab (Order#[id number], Rx#[id number])          AG Epclusa refill (2 of 3) confirmed and reassessment complete.     Patient has ~6 doses of Epclusa remaining and takes it around the morning time daily. Pt reports they are not having any side effects at this time. No missed doses, no new medications, no new allergies or health conditions reported at this time. Allergies reviewed and medication reconciliation complete (reviewed and documented in [facility] and [facility]). Disease education reviewed (including transmission and prevention). Patient counseled on importance of maintaining adherence and keeping lab appointments which were scheduled. All questions answered and addressed to patients satisfaction. Advised to call [facility] and provider if any issues arise.     At the beginning of the phone call, patient sounded severely SOB. [facility] questioned if he has been using inhalers as prescribed and he confirms he has been. [facility] advised to complete Duo-Neb nebulizer treatment today to help with breathing. He can do this every 4 hours PRN until breathing improves. If it does not improve, he should present to ER. Patient also reminded of f/u appt with Dr. Calderon on 9/9/22. Patient verbalized understanding. Provider also notified of the above.     Refill Questions - Documented Responses      Flowsheet Row Most Recent Value   Patient Availability and HIPAA Verification    Does patient want to proceed with activity? Yes   HIPAA/medical authority confirmed? Yes   Relationship to patient of person spoken to? Self   Refill Screening Questions    Changes to allergies? No   Changes to medications? No   New conditions since last clinic visit? No   Unplanned office visit, urgent care, ED, or hospital admission in the last 4  weeks? No   How does patient/caregiver feel medication is working? Too soon to tell   Financial problems or insurance changes? No   How many doses of your specialty medications were missed in the last 4 weeks? 0   Would patient like to speak to a pharmacist? No   When does the patient need to receive the medication? 09/06/22   Refill Delivery Questions    How will the patient receive the medication? Delivery Leti   When does the patient need to receive the medication? 09/06/22   Shipping Address Home   Address in [facility] confirmed and updated if neccessary? Yes   Expected Copay ($) 0   Is the patient able to afford the medication copay? Yes   Payment Method zero copay   Days supply of Refill 28   Supplies needed? No supplies needed   Refill activity completed? Yes   Refill activity plan Refill scheduled   Shipment/Pickup Date: 08/31/22            Current Outpatient Medications   Medication Sig    albuterol-ipratropium (DUO-NEB) 2.5 mg-0.5 mg/3 mL nebulizer solution Take 3 mLs by nebulization every 4 (four) hours as needed for Shortness of Breath. Rescue    ascorbic acid, vitamin C, (VITAMIN C) 500 MG tablet Take 1,000 mg by mouth once daily.     aspirin (ECOTRIN) 81 MG EC tablet Take 81 mg by mouth.    b complex vitamins capsule Take 1 capsule by mouth once daily.    buprenorphine-naloxone (SUBOXONE) 8-2 mg Film Place 3 packets (3 each total) under the tongue once daily. Take 2.5 Films per day (Patient taking differently: Place 1 each under the tongue 3 (three) times daily.)    clonazePAM (KLONOPIN) 0.5 MG tablet Take 0.5 mg by mouth 3 (three) times daily as needed.    fluticasone-umeclidin-vilanter (TRELEGY ELLIPTA) 100-62.5-25 mcg DsDv Inhale 1 puff into the lungs once daily.    gabapentin (NEURONTIN) 800 MG tablet TAKE 1 TABLET BY MOUTH THREE TIMES A DAY.    metFORMIN (GLUCOPHAGE) 500 MG tablet Take 1 tablet (500 mg total) by mouth 2 (two) times daily with meals.    multivitamin (THERAGRAN) per tablet  Take 1 tablet by mouth once daily.    ondansetron (ZOFRAN-ODT) 4 MG TbDL Take 1 tablet (4 mg total) by mouth every 6 (six) hours as needed.    sofosbuvir-velpatasvir 400-100 mg Tab Take 1 tablet by mouth once daily.   Last reviewed on 8/9/2022 10:09 AM by Katlin Otto, Kristi    Review of patient's allergies indicates:   Allergen Reactions    Ms contin [morphine] Nausea Only    Soma [carisoprodol] Nausea And Vomiting    Tylenol [acetaminophen]      Liver damage    Ultram [tramadol] Nausea Only    Aspirin Other (See Comments)     CAN take low dose. Gets stomach ulcers and indigestion    Penicillins Rash    Last reviewed on  8/17/2022 2:49 PM by Anupama Sanderson      Tasks added this encounter   9/27/2022 - Refill Call (Auto Added)   Tasks due within next 3 months   No tasks due.     Katlin Otto, PharmD  [facility]  [address] [place], [place] [address]  [place] [address]  Phone: [phone]  Fax: [phone]

## 2022-09-23 ENCOUNTER — SPECIALTY PHARMACY (OUTPATIENT)
Dept: PHARMACY | Facility: CLINIC | Age: 62
End: 2022-09-23
Payer: MEDICARE

## 2022-09-23 NOTE — TELEPHONE ENCOUNTER
Specialty Pharmacy - Refill Coordination    Specialty Medication Orders Linked to Encounter      Flowsheet Row Most Recent Value   Medication #1 sofosbuvir-velpatasvir 400-100 mg Tab (Order#[id number], Rx#[id number])            Refill Questions - Documented Responses      Flowsheet Row Most Recent Value   Patient Availability and HIPAA Verification    Does patient want to proceed with activity? Yes   HIPAA/medical authority confirmed? Yes   Relationship to patient of person spoken to? Self   Refill Screening Questions    Changes to allergies? No   Changes to medications? No   New conditions since last clinic visit? No   Unplanned office visit, urgent care, ED, or hospital admission in the last 4 weeks? No   How does patient/caregiver feel medication is working? Good   Financial problems or insurance changes? No   How many doses of your specialty medications were missed in the last 4 weeks? 0   Would patient like to speak to a pharmacist? No   When does the patient need to receive the medication? 09/27/22   Refill Delivery Questions    How will the patient receive the medication? Delivery Leti   When does the patient need to receive the medication? 09/27/22   Shipping Address Home   Address in [facility] confirmed and updated if neccessary? Yes   Expected Copay ($) 0   Is the patient able to afford the medication copay? Yes   Payment Method zero copay   Days supply of Refill 28   Supplies needed? No supplies needed   Refill activity completed? Yes   Refill activity plan Refill scheduled   Shipment/Pickup Date: 09/26/22            Current Outpatient Medications   Medication Sig    albuterol-ipratropium (DUO-NEB) 2.5 mg-0.5 mg/3 mL nebulizer solution Take 3 mLs by nebulization every 4 (four) hours as needed for Shortness of Breath. Rescue    ascorbic acid, vitamin C, (VITAMIN C) 500 MG tablet Take 1,000 mg by mouth once daily.     aspirin (ECOTRIN) 81 MG EC tablet Take 81 mg by mouth.    b complex vitamins  capsule Take 1 capsule by mouth once daily.    buprenorphine-naloxone (SUBOXONE) 8-2 mg Film Place 3 packets (3 each total) under the tongue once daily. Take 2.5 Films per day (Patient taking differently: Place 1 each under the tongue 3 (three) times daily.)    clonazePAM (KLONOPIN) 0.5 MG tablet Take 0.5 mg by mouth 3 (three) times daily as needed.    fluticasone-umeclidin-vilanter (TRELEGY ELLIPTA) 100-62.5-25 mcg DsDv Inhale 1 puff into the lungs once daily.    gabapentin (NEURONTIN) 800 MG tablet TAKE 1 TABLET BY MOUTH THREE TIMES A DAY.    metFORMIN (GLUCOPHAGE) 500 MG tablet Take 1 tablet (500 mg total) by mouth 2 (two) times daily with meals.    multivitamin (THERAGRAN) per tablet Take 1 tablet by mouth once daily.    ondansetron (ZOFRAN-ODT) 4 MG TbDL Take 1 tablet (4 mg total) by mouth every 6 (six) hours as needed.    sofosbuvir-velpatasvir 400-100 mg Tab Take 1 tablet by mouth once daily.   Last reviewed on 8/9/2022 10:09 AM by Katlin Otto, Kristi    Review of patient's allergies indicates:   Allergen Reactions    Ms contin [morphine] Nausea Only    Soma [carisoprodol] Nausea And Vomiting    Tylenol [acetaminophen]      Liver damage    Ultram [tramadol] Nausea Only    Aspirin Other (See Comments)     CAN take low dose. Gets stomach ulcers and indigestion    Penicillins Rash    Last reviewed on  8/17/2022 2:49 PM by Anupama Sanderson      Tasks added this encounter   10/18/2022 - Refill Call (Auto Added)   Tasks due within next 3 months   No tasks due.     Monica Perez, PharmD  [facility]  [address] [place] [address]  Phone: [phone]  Fax: [phone]

## 2022-10-10 ENCOUNTER — PATIENT MESSAGE (OUTPATIENT)
Dept: ADMINISTRATIVE | Facility: HOSPITAL | Age: 62
End: 2022-10-10
Payer: MEDICARE

## 2022-10-27 ENCOUNTER — TELEPHONE (OUTPATIENT)
Dept: HEPATOLOGY | Facility: CLINIC | Age: 62
End: 2022-10-27
Payer: MEDICARE

## 2022-10-28 NOTE — TELEPHONE ENCOUNTER
----- Message from Shari Gleason RN sent at 10/27/2022 11:09 AM CDT -----  Multiple attempts made to coordinate wk 6 labs ordered on 9/20/22.  Episode moved to svr 12 draw.

## 2022-11-21 DIAGNOSIS — R06.00 DYSPNEA, UNSPECIFIED TYPE: ICD-10-CM

## 2022-11-21 NOTE — TELEPHONE ENCOUNTER
----- Message from Zeenat Robert sent at 11/19/2022  9:34 AM CST -----  Contact: pt  Type:  RX Refill Request    Who Called:  pt  Refill or New Rx:  refill  RX Name and Strength:  fluticasone-umeclidin-vilanter (TRELEGY ELLIPTA) 100-62.5-25 mcg DsDv 60 each  Sig - Route: Inhale 1 puff into the lungs once daily. - Inhalation      How is the patient currently taking it? (ex. 1XDay):  as order  Is this a 30 day or 90 day RX:  as order  Preferred Pharmacy with phone number:    [facility] - [place], [place] - [address]  [address] [place] [address]  Phone: [phone] Fax: [phone]      Local or Mail Order:  local  Ordering Provider:  ardha Zavala Call Back Number:  [phone]  Additional Information:  pt went to see someone and lost his medication

## 2022-11-21 NOTE — TELEPHONE ENCOUNTER
Care Due:                  Date            Visit Type   Department     Provider  --------------------------------------------------------------------------------                                ESTABLISHED                              PATIENT -    [facility]  Last Visit: 06-      VIRTUAL      MEDICINE       Ynes Calderon                               -                              PRIMARY      [facility]  Next Visit: 12-      CARE ([facility])   MEDICINE       Ynes Calderon                                                            Last  Test          Frequency    Reason                     Performed    Due Date  --------------------------------------------------------------------------------    HBA1C.......  6 months...  metFORMIN................  07- 01-    [employer] Embedded Care Gaps. Reference number: [id number]. 11/21/2022   12:09:52 PM CST

## 2022-11-25 ENCOUNTER — PATIENT MESSAGE (OUTPATIENT)
Dept: ADMINISTRATIVE | Facility: HOSPITAL | Age: 62
End: 2022-11-25
Payer: MEDICARE

## 2022-11-25 ENCOUNTER — PATIENT OUTREACH (OUTPATIENT)
Dept: ADMINISTRATIVE | Facility: HOSPITAL | Age: 62
End: 2022-11-25
Payer: MEDICARE

## 2022-11-25 NOTE — PROGRESS NOTES
2022 Care Everywhere updates requested and reviewed.  Immunizations reconciled. Media reports reviewed.  Duplicate HM overrides and  orders removed.  Overdue HM topic chart audit and/or requested.  Overdue lab testing linked to upcoming lab appointments if applies.  Lab aman, and [employer] reviewed   Lab testing    Health Maintenance Due   Topic Date Due    Diabetes Urine Screening  Never done    Foot Exam  Never done    Eye Exam  Never done    TETANUS VACCINE  Never done    Low Dose Statin  Never done    Colorectal Cancer Screening  Never done    Shingles Vaccine (1 of 2) Never done    COVID-19 Vaccine (3 - Booster for Pfizer series) 2021    Influenza Vaccine (1) 2022

## 2022-11-25 NOTE — LETTER
December 2, 2022    Cholo Noguiera  [address] [facility] [address] [address] [facility] [address]             [facility]  [address]  [facility] [address]  Phone: [phone] Dear Kenneth [facility] is committed to your overall health.  To help you get the most out of each of your visits, we will review your information to make sure you are up to date on all of your recommended tests and/or procedures.       Ynes Calderon MD  has found that your chart shows you may be due for :         Health Maintenance Due   Topic    Diabetes Urine Screening     Foot Exam     Eye Exam     TETANUS VACCINE     Colorectal Cancer Screening     Shingles Vaccine     COVID-19 Vaccine (3 - Booster for Pfizer series)    Influenza Vaccine         If you have had any of the above done at another facility, please bring the records or information with you so that your record at [facility] will be complete.  If you would like to schedule any of these test, please call [phone] or send a message via [email or web address].        If you are currently taking medication, please bring it with you to your appointment for review.           Thank you for letting us care for you,        Ynes Calderon MD and your [facility] Team

## 2022-11-29 ENCOUNTER — PATIENT OUTREACH (OUTPATIENT)
Dept: ADMINISTRATIVE | Facility: HOSPITAL | Age: 62
End: 2022-11-29
Payer: MEDICARE

## 2022-11-29 NOTE — PROGRESS NOTES
Non-compliant report chart audits. Chart review completed for HM test overdue (Mammogram, Colon Cancer Screening, Pap smear, DM labs, BP Check and/or Eye Exam)      Care Everywhere and media, updates requested and reviewed.        Attempted to contact pt about scheduling eye exam, LMOR. Returned pt's call, LMOR. Portal message sent.

## 2022-12-05 ENCOUNTER — PATIENT MESSAGE (OUTPATIENT)
Dept: ADMINISTRATIVE | Facility: HOSPITAL | Age: 62
End: 2022-12-05
Payer: MEDICARE

## 2022-12-05 NOTE — LETTER
December 13, 2022    Cholo Nogueira  [address] [facility] [address]             [facility]  [address]  [facility] [address]  Phone: [phone] Dear Mr. Nogueira,     Your [facility] primary care team is dedicated to assisting you achieve your health goal.  In order to maintain your goal, scheduling your diabetic health maintenance requirements are winters to successful disease management.      Ynes Calderon MD has reviewed your records and found that you are overdue for your diabetic eye exam.  Yearly eye exams are especially important, as this can identify early eye complications and disease caused by your diabetes.  Ynes Calderon MD has requested you schedule your diabetic eye exam and encourages you to schedule at any of the [facility] locations.  You can be seen conveniently at the [facility] location by calling [phone].       If you have already completed this exam at an outside facility, please notify us so we may request a copy of the exam and update your chart.      Sincerely,      Ynes Calderon MD and your [facility]

## 2022-12-22 ENCOUNTER — PATIENT MESSAGE (OUTPATIENT)
Dept: ADMINISTRATIVE | Facility: HOSPITAL | Age: 62
End: 2022-12-22
Payer: MEDICARE

## 2023-01-17 ENCOUNTER — PATIENT MESSAGE (OUTPATIENT)
Dept: ADMINISTRATIVE | Facility: HOSPITAL | Age: 63
End: 2023-01-17
Payer: MEDICARE

## 2023-02-07 DIAGNOSIS — R06.00 DYSPNEA, UNSPECIFIED TYPE: ICD-10-CM

## 2023-02-07 RX ORDER — FLUTICASONE FUROATE, UMECLIDINIUM BROMIDE AND VILANTEROL TRIFENATATE 100; 62.5; 25 UG/1; UG/1; UG/1
POWDER RESPIRATORY (INHALATION)
Qty: 180 EACH | Refills: 0 | Status: SHIPPED | OUTPATIENT
Start: 2023-02-07 | End: 2024-03-06

## 2023-02-07 NOTE — TELEPHONE ENCOUNTER
Refill Routing Note   Medication(s) are not appropriate for processing by [facility] for the following reason(s):       Drug-drug interation: albuterol-ipratropium, TRELEGY ELLIPTA    [facility] action(s):  Defer                   Appointments  past 12m or future 3m with PCP    Date Provider   Last Visit   6/3/2022 nYes Calderon MD   Next Visit   2/8/2023 Ynes Calderon MD   ED visits in past 90 days: 0        Note composed:12:46 PM 02/07/2023

## 2023-02-07 NOTE — TELEPHONE ENCOUNTER
Care Due:                  Date            Visit Type   Department     Provider  --------------------------------------------------------------------------------                                ESTABLISHED                              PATIENT -    [facility]  Last Visit: 06-      VIRTUAL      MEDICINE       Ynes Calderon                               -                              PRIMARY      [facility]  Next Visit: 02-      CARE ([facility])   MEDICINE       Ynes Calderon                                                            Last  Test          Frequency    Reason                     Performed    Due Date  --------------------------------------------------------------------------------    HBA1C.......  6 months...  metFORMIN................  07- 01-    [employer] Embedded Care Gaps. Reference number: [id number]. 2/07/2023   9:55:44 AM CST

## 2023-02-09 ENCOUNTER — TELEPHONE (OUTPATIENT)
Dept: FAMILY MEDICINE | Facility: CLINIC | Age: 63
End: 2023-02-09
Payer: MEDICARE

## 2023-02-09 NOTE — TELEPHONE ENCOUNTER
----- Message from Bhupendra Quiñones sent at 2/9/2023  8:33 AM CST -----  Contact: pt  Type:  Sooner Appointment Request    Caller is requesting a sooner appointment.  Caller declined first available appointment listed below.  Caller will not accept being placed on the waitlist and is requesting a message be sent to doctor.    Name of Caller:  pt   When is the first available appointment?  03/03  Symptoms:  annual   Best Call Back Number:  [phone]    Additional Information:  pt would like a sooner appt he states he needs to know three days before appt to schedule his ride. Please advise.

## 2023-02-16 ENCOUNTER — TELEPHONE (OUTPATIENT)
Dept: HEPATOLOGY | Facility: CLINIC | Age: 63
End: 2023-02-16
Payer: MEDICARE

## 2023-02-16 NOTE — TELEPHONE ENCOUNTER
Having a very difficult time reach patient.  Attempt made to reach him again today.  LVM asking that he call hepatology back.  Attempt made to reach significant other Iza ([phone]); unable to LVM.  Left [facility] for his relative Rajwinder ([phone]); I ask that she call hepatology back.

## 2023-03-01 ENCOUNTER — TELEPHONE (OUTPATIENT)
Dept: HEPATOLOGY | Facility: CLINIC | Age: 63
End: 2023-03-01
Payer: MEDICARE

## 2023-03-02 NOTE — TELEPHONE ENCOUNTER
----- Message from Shari Gleason RN sent at 3/1/2023 10:12 AM CST -----  Epclusa X 12 wks.  No advanced fibrosis.  Multiple attempts made to coordinate wk 6 and 2/2/23 svr 12 draw.  No success.  Okay to close episode?

## 2023-04-11 ENCOUNTER — PATIENT MESSAGE (OUTPATIENT)
Dept: ADMINISTRATIVE | Facility: HOSPITAL | Age: 63
End: 2023-04-11
Payer: MEDICARE

## 2023-04-20 ENCOUNTER — PATIENT MESSAGE (OUTPATIENT)
Dept: ADMINISTRATIVE | Facility: HOSPITAL | Age: 63
End: 2023-04-20
Payer: MEDICARE

## 2023-05-03 DIAGNOSIS — R06.00 DYSPNEA, UNSPECIFIED TYPE: ICD-10-CM

## 2023-05-03 RX ORDER — FLUTICASONE FUROATE, UMECLIDINIUM BROMIDE AND VILANTEROL TRIFENATATE 100; 62.5; 25 UG/1; UG/1; UG/1
POWDER RESPIRATORY (INHALATION)
Qty: 180 EACH | Refills: 0 | OUTPATIENT
Start: 2023-05-03

## 2023-05-03 NOTE — TELEPHONE ENCOUNTER
Attempted to contact pt to schedule appt. Pt last seen on 6/3/2022 and needs appt prior to med refills. No answer. Unable to leave  for pt.

## 2023-05-03 NOTE — TELEPHONE ENCOUNTER
Care Due:                  Date            Visit Type   Department     Provider  --------------------------------------------------------------------------------                                ESTABLISHED                              PATIENT -    [facility]  Last Visit: 06-      [employer]      MEDICINE       Ynes Calderon  Next Visit: None Scheduled  None         None Found                                                            Last  Test          Frequency    Reason                     Performed    Due Date  --------------------------------------------------------------------------------    HBA1C.......  6 months...  metFORMIN................  07- 01-    [facility] Embedded Care Due Messages. Reference number: [id number].   5/03/2023 9:24:20 AM CDT

## 2023-05-08 DIAGNOSIS — E11.9 TYPE 2 DIABETES MELLITUS WITHOUT COMPLICATION, WITHOUT LONG-TERM CURRENT USE OF INSULIN: ICD-10-CM

## 2023-05-08 DIAGNOSIS — R06.00 DYSPNEA, UNSPECIFIED TYPE: ICD-10-CM

## 2023-05-08 RX ORDER — FLUTICASONE FUROATE, UMECLIDINIUM BROMIDE AND VILANTEROL TRIFENATATE 100; 62.5; 25 UG/1; UG/1; UG/1
POWDER RESPIRATORY (INHALATION)
Qty: 180 EACH | Refills: 0 | OUTPATIENT
Start: 2023-05-08

## 2023-05-08 RX ORDER — METFORMIN HYDROCHLORIDE 500 MG/1
500 TABLET ORAL 2 TIMES DAILY WITH MEALS
Qty: 180 TABLET | Refills: 0 | OUTPATIENT
Start: 2023-05-08 | End: 2023-08-06

## 2023-05-08 NOTE — TELEPHONE ENCOUNTER
No care due was identified.  [facility] Embedded Care Due Messages. Reference number: [id number].   5/08/2023 5:53:59 PM CDT

## 2023-06-26 ENCOUNTER — TELEPHONE (OUTPATIENT)
Dept: FAMILY MEDICINE | Facility: CLINIC | Age: 63
End: 2023-06-26
Payer: MEDICARE

## 2023-06-26 ENCOUNTER — TELEPHONE (OUTPATIENT)
Dept: HEPATOLOGY | Facility: CLINIC | Age: 63
End: 2023-06-26
Payer: MEDICARE

## 2023-06-26 DIAGNOSIS — Z12.5 SCREENING FOR MALIGNANT NEOPLASM OF PROSTATE: ICD-10-CM

## 2023-06-26 DIAGNOSIS — Z00.00 ROUTINE GENERAL MEDICAL EXAMINATION AT A HEALTH CARE FACILITY: ICD-10-CM

## 2023-06-26 DIAGNOSIS — E11.9 TYPE 2 DIABETES MELLITUS WITHOUT COMPLICATION, WITHOUT LONG-TERM CURRENT USE OF INSULIN: Primary | ICD-10-CM

## 2023-06-26 NOTE — TELEPHONE ENCOUNTER
Patient needs to schedule svr 12 labs to determine if hep c treatment was a success.  Attempt made to reach him to setup blood draw unsuccessful; number unreachable.

## 2023-06-26 NOTE — TELEPHONE ENCOUNTER
Placed order for CMP, Lipid and A1c. Please review and advise if any other labs needed at this time.

## 2023-06-26 NOTE — TELEPHONE ENCOUNTER
----- Message from Swati Hayes sent at 6/26/2023  8:57 AM CDT -----  Regarding: Orders  Contact: Pt  Type: Needs Medical Advice  Who Called:  Pt  Symptoms (please be specific):   How long has patient had these symptoms:    Pharmacy name and phone #:    Best Call Back Number: [phone] (home)     Additional Information: Pt needs annual Hepatitis and labs and CT scan. Please call patient to advise. Thanks!

## 2023-06-26 NOTE — TELEPHONE ENCOUNTER
----- Message from Swati Hayes sent at 6/26/2023  9:00 AM CDT -----  Regarding: Orders  Contact: Pt  Type: Needs Medical Advice  Who Called:  Pt  Symptoms (please be specific):   How long has patient had these symptoms:    Pharmacy name and phone #:    Best Call Back Number: [phone] (home)     Additional Information: Pt needs annual labs. Patient's appt is for 07/19/23. Please call patient to advise. Thanks!

## 2023-07-08 DIAGNOSIS — E11.9 TYPE 2 DIABETES MELLITUS WITHOUT COMPLICATION, WITHOUT LONG-TERM CURRENT USE OF INSULIN: ICD-10-CM

## 2023-07-08 NOTE — TELEPHONE ENCOUNTER
Care Due:                  Date            Visit Type   Department     Provider  --------------------------------------------------------------------------------                                ESTABLISHED                              PATIENT -    [facility]  Last Visit: 06-      VIRTUAL      MEDICINE       Ynes Calderon                               -                              PRIMARY      [facility]  Next Visit: 07-      CARE ([facility])   MEDICINE       Ynes Calderon                                                            Last  Test          Frequency    Reason                     Performed    Due Date  --------------------------------------------------------------------------------    HBA1C.......  6 months...  metFORMIN................  07- 01-    [employer] Embedded Care Due Messages. Reference number: [id number].   7/08/2023 11:25:56 AM CDT

## 2023-07-10 RX ORDER — METFORMIN HYDROCHLORIDE 500 MG/1
500 TABLET ORAL 2 TIMES DAILY WITH MEALS
Qty: 180 TABLET | Refills: 0 | Status: SHIPPED | OUTPATIENT
Start: 2023-07-10 | End: 2024-03-21

## 2023-07-24 ENCOUNTER — NURSE TRIAGE (OUTPATIENT)
Dept: ADMINISTRATIVE | Facility: CLINIC | Age: 63
End: 2023-07-24
Payer: MEDICARE

## 2023-07-24 ENCOUNTER — TELEPHONE (OUTPATIENT)
Dept: FAMILY MEDICINE | Facility: CLINIC | Age: 63
End: 2023-07-24
Payer: MEDICARE
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
## 2023-07-31 DIAGNOSIS — J44.9 CHRONIC OBSTRUCTIVE PULMONARY DISEASE, UNSPECIFIED COPD TYPE: Primary | ICD-10-CM

## 2023-07-31 RX ORDER — ALBUTEROL SULFATE 0.83 MG/ML
2.5 SOLUTION RESPIRATORY (INHALATION) EVERY 6 HOURS PRN
Qty: 75 ML | Refills: 0 | Status: SHIPPED | OUTPATIENT
Start: 2023-07-31 | End: 2024-07-30

## 2023-07-31 NOTE — TELEPHONE ENCOUNTER
----- Message from Maryann Melton sent at 7/31/2023  9:57 AM CDT -----  Regarding: Refill albuterol nebulizer solution 7.5 mg  Type:  RX Refill Request    Who Called: Pt    Refill or New Rx:Refill    RX Name and Strength:albuterol nebulizer solution 7.5 mg       How is the patient currently taking it? (ex. 1XDay):2/3xday      Preferred Pharmacy with phone number:  [facility] [place] [address] [address] [address]  [address] [address]  Phone: [phone] Fax: [phone]    Local or Mail Order:Local    Ordering Provider:DR Calderon    Would the patient rather a call back or a response via [facility]? Callback    Best Call Back Number:[phone]    Additional Information:Pt was in the hospital.  Please advise ----------------- Thank you

## 2023-07-31 NOTE — TELEPHONE ENCOUNTER
No care due was identified.  [facility] Embedded Care Due Messages. Reference number: [id number].   7/31/2023 11:00:02 AM CDT

## 2023-09-27 ENCOUNTER — TELEPHONE (OUTPATIENT)
Dept: SMOKING CESSATION | Facility: CLINIC | Age: 63
End: 2023-09-27
Payer: MEDICARE

## 2023-12-18 ENCOUNTER — PATIENT MESSAGE (OUTPATIENT)
Dept: ADMINISTRATIVE | Facility: HOSPITAL | Age: 63
End: 2023-12-18
Payer: MEDICARE

## 2023-12-29 ENCOUNTER — TELEPHONE (OUTPATIENT)
Dept: FAMILY MEDICINE | Facility: CLINIC | Age: 63
End: 2023-12-29
Payer: MEDICARE

## 2023-12-29 NOTE — TELEPHONE ENCOUNTER
----- Message from Zee No, Patient Care Assistant sent at 12/29/2023 12:53 PM CST -----  Regarding: refill  Type:  RX Refill Request    Who Called:  pt   Refill or New Rx:  refill   RX Name and Strength:  albuterol (PROVENTIL) 2.5 mg /3 mL (0.083 %) nebulizer solution    How is the patient currently taking it? 1xday   Is this a 30 day or 90 day RX:  90  Preferred Pharmacy with phone number:    [facility] [place], [place] - [address] [facility] [address]  [address] [address]  Phone: [phone] Fax: [phone]    Local or Mail Order:  local   Ordering Provider:  Yumiko Zavala Call Back Number:  [phone] (home)     Additional Information:  please call to advise. Thanks!

## 2023-12-29 NOTE — TELEPHONE ENCOUNTER
Sent pt a portal message to schedule appointment for Medicine to be refill last office visit was 06/30/22.

## 2024-01-05 ENCOUNTER — TELEPHONE (OUTPATIENT)
Dept: FAMILY MEDICINE | Facility: CLINIC | Age: 64
End: 2024-01-05
Payer: MEDICARE

## 2024-01-06 ENCOUNTER — TELEPHONE (OUTPATIENT)
Dept: FAMILY MEDICINE | Facility: CLINIC | Age: 64
End: 2024-01-06
Payer: MEDICARE

## 2024-01-06 NOTE — TELEPHONE ENCOUNTER
----- Message from Ebony Angeles sent at 1/5/2024  4:48 PM CST -----  Regarding: rt call  Type:  Patient Returning Call    Who Called:pt    Who Left Message for Patient:Kg Penn    Does the patient know what this is regarding?:yes    Best Call Back Number:[phone]      Additional Information:

## 2024-01-25 ENCOUNTER — TELEPHONE (OUTPATIENT)
Dept: FAMILY MEDICINE | Facility: CLINIC | Age: 64
End: 2024-01-25
Payer: MEDICARE
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
## 2024-01-25 NOTE — TELEPHONE ENCOUNTER
Called patient and he stated he had to reschedule appt due to weather and he was hoping to get in sooner than phone staff could schedule. Got patient a sooner appt scheduled

## 2024-02-14 DIAGNOSIS — J44.9 CHRONIC OBSTRUCTIVE PULMONARY DISEASE, UNSPECIFIED COPD TYPE: ICD-10-CM

## 2024-02-14 RX ORDER — ALBUTEROL SULFATE 0.83 MG/ML
2.5 SOLUTION RESPIRATORY (INHALATION) EVERY 6 HOURS PRN
Qty: 75 ML | Refills: 0 | OUTPATIENT
Start: 2024-02-14 | End: 2025-02-13

## 2024-02-14 NOTE — TELEPHONE ENCOUNTER
"No, I will not fill this or anything else. I haven't seen him in almost 2 years and he has "no-showed" for the last 4 visits with me.  "

## 2024-02-14 NOTE — TELEPHONE ENCOUNTER
No care due was identified.  [facility] Embedded Care Due Messages. Reference number: [id number].   2/14/2024 11:15:55 AM CST

## 2024-02-14 NOTE — TELEPHONE ENCOUNTER
----- Message from Patrice Chang sent at 2/14/2024  9:23 AM CST -----  Type:  RX Refill Request    Who Called:  Patient  Refill or New Rx:  Refill  RX Name and Strength:    Simcort-- Not on file    albuterol (PROVENTIL) 2.5 mg /3 mL (0.083 %) nebulizer solution  Take 3 mLs (2.5 mg total) by nebulization every 6 (six) hours as needed for Wheezing    Preferred Pharmacy with phone number:    [facility] - [address] [address]  Phone: [phone] Fax: [phone]      Local or Mail Order:  Local  Ordering Provider:  Yumiko Zavala Call Back Number:  [phone]  Additional Information:

## 2024-02-23 ENCOUNTER — TELEPHONE (OUTPATIENT)
Dept: FAMILY MEDICINE | Facility: CLINIC | Age: 64
End: 2024-02-23
Payer: MEDICARE

## 2024-03-13 PROBLEM — L98.499 ULCER OF FINGER: Status: RESOLVED | Noted: 2020-05-05 | Resolved: 2024-03-13

## 2024-03-13 PROBLEM — D69.6 THROMBOCYTOPENIA: Status: RESOLVED | Noted: 2022-06-03 | Resolved: 2024-03-13

## 2025-07-09 NOTE — TELEPHONE ENCOUNTER
PSA and urine micro added